# Patient Record
Sex: MALE | Race: WHITE | NOT HISPANIC OR LATINO | Employment: OTHER | URBAN - NONMETROPOLITAN AREA
[De-identification: names, ages, dates, MRNs, and addresses within clinical notes are randomized per-mention and may not be internally consistent; named-entity substitution may affect disease eponyms.]

---

## 2017-01-01 ENCOUNTER — HOSPITAL ENCOUNTER (INPATIENT)
Facility: HOSPITAL | Age: 82
LOS: 1 days | Discharge: NURSING, REHAB OR LONG-TERM CARE | End: 2017-11-30
Attending: EMERGENCY MEDICINE | Admitting: FAMILY MEDICINE

## 2017-01-01 ENCOUNTER — HOSPITAL ENCOUNTER (EMERGENCY)
Facility: HOSPITAL | Age: 82
Discharge: NURSING, REHAB OR LONG-TERM CARE | End: 2017-01-20
Attending: EMERGENCY MEDICINE | Admitting: EMERGENCY MEDICINE

## 2017-01-01 ENCOUNTER — HOSPITAL ENCOUNTER (EMERGENCY)
Facility: HOSPITAL | Age: 82
Discharge: HOME | End: 2017-12-07
Attending: EMERGENCY MEDICINE | Admitting: EMERGENCY MEDICINE

## 2017-01-01 ENCOUNTER — APPOINTMENT (OUTPATIENT)
Dept: CT IMAGING | Facility: HOSPITAL | Age: 82
End: 2017-01-01

## 2017-01-01 ENCOUNTER — OFFICE VISIT (OUTPATIENT)
Dept: UROLOGY | Facility: CLINIC | Age: 82
End: 2017-01-01

## 2017-01-01 ENCOUNTER — APPOINTMENT (OUTPATIENT)
Dept: GENERAL RADIOLOGY | Facility: HOSPITAL | Age: 82
End: 2017-01-01

## 2017-01-01 ENCOUNTER — HOSPITAL ENCOUNTER (OUTPATIENT)
Dept: CT IMAGING | Facility: HOSPITAL | Age: 82
Discharge: HOME | End: 2017-03-14
Attending: UROLOGY | Admitting: UROLOGY

## 2017-01-01 ENCOUNTER — EPISODE CHANGES (OUTPATIENT)
Dept: CASE MANAGEMENT | Facility: OTHER | Age: 82
End: 2017-01-01

## 2017-01-01 ENCOUNTER — LAB REQUISITION (OUTPATIENT)
Dept: LAB | Facility: HOSPITAL | Age: 82
End: 2017-01-01

## 2017-01-01 ENCOUNTER — HOSPITAL ENCOUNTER (OUTPATIENT)
Dept: GENERAL RADIOLOGY | Facility: HOSPITAL | Age: 82
Discharge: HOME | End: 2017-07-05
Attending: FAMILY MEDICINE | Admitting: FAMILY MEDICINE

## 2017-01-01 ENCOUNTER — HOSPITAL ENCOUNTER (EMERGENCY)
Facility: HOSPITAL | Age: 82
Discharge: NURSING, REHAB OR LONG-TERM CARE | End: 2017-10-27
Attending: FAMILY MEDICINE | Admitting: FAMILY MEDICINE

## 2017-01-01 ENCOUNTER — TRANSCRIBE ORDERS (OUTPATIENT)
Dept: ADMINISTRATIVE | Facility: HOSPITAL | Age: 82
End: 2017-01-01

## 2017-01-01 ENCOUNTER — HOSPITAL ENCOUNTER (OUTPATIENT)
Dept: CT IMAGING | Facility: HOSPITAL | Age: 82
Discharge: HOME | End: 2017-10-25
Attending: UROLOGY | Admitting: UROLOGY

## 2017-01-01 ENCOUNTER — OFFICE VISIT (OUTPATIENT)
Dept: CARDIOLOGY | Facility: CLINIC | Age: 82
End: 2017-01-01

## 2017-01-01 ENCOUNTER — HOSPITAL ENCOUNTER (EMERGENCY)
Facility: HOSPITAL | Age: 82
Discharge: NURSING, REHAB OR LONG-TERM CARE | End: 2017-02-26
Attending: EMERGENCY MEDICINE | Admitting: EMERGENCY MEDICINE

## 2017-01-01 ENCOUNTER — HOSPITAL ENCOUNTER (EMERGENCY)
Facility: HOSPITAL | Age: 82
Discharge: HOME | End: 2017-02-11
Attending: FAMILY MEDICINE | Admitting: FAMILY MEDICINE

## 2017-01-01 ENCOUNTER — TELEPHONE (OUTPATIENT)
Dept: UROLOGY | Facility: CLINIC | Age: 82
End: 2017-01-01

## 2017-01-01 VITALS
DIASTOLIC BLOOD PRESSURE: 76 MMHG | OXYGEN SATURATION: 96 % | SYSTOLIC BLOOD PRESSURE: 137 MMHG | WEIGHT: 180 LBS | BODY MASS INDEX: 23.1 KG/M2 | HEIGHT: 74 IN | TEMPERATURE: 98.1 F | RESPIRATION RATE: 15 BRPM | HEART RATE: 91 BPM

## 2017-01-01 VITALS
HEIGHT: 71 IN | WEIGHT: 180 LBS | SYSTOLIC BLOOD PRESSURE: 116 MMHG | DIASTOLIC BLOOD PRESSURE: 64 MMHG | BODY MASS INDEX: 25.2 KG/M2 | HEART RATE: 81 BPM

## 2017-01-01 VITALS
DIASTOLIC BLOOD PRESSURE: 70 MMHG | HEIGHT: 71 IN | OXYGEN SATURATION: 95 % | HEART RATE: 87 BPM | WEIGHT: 177.5 LBS | TEMPERATURE: 97 F | RESPIRATION RATE: 18 BRPM | BODY MASS INDEX: 24.85 KG/M2 | SYSTOLIC BLOOD PRESSURE: 122 MMHG

## 2017-01-01 VITALS
DIASTOLIC BLOOD PRESSURE: 70 MMHG | TEMPERATURE: 98.2 F | BODY MASS INDEX: 25.67 KG/M2 | OXYGEN SATURATION: 96 % | WEIGHT: 200 LBS | SYSTOLIC BLOOD PRESSURE: 115 MMHG | RESPIRATION RATE: 20 BRPM | HEIGHT: 74 IN | HEART RATE: 82 BPM

## 2017-01-01 VITALS
SYSTOLIC BLOOD PRESSURE: 114 MMHG | HEIGHT: 71 IN | HEART RATE: 80 BPM | DIASTOLIC BLOOD PRESSURE: 62 MMHG | BODY MASS INDEX: 24.92 KG/M2 | WEIGHT: 178 LBS

## 2017-01-01 VITALS
WEIGHT: 183.64 LBS | DIASTOLIC BLOOD PRESSURE: 60 MMHG | BODY MASS INDEX: 24.87 KG/M2 | HEIGHT: 72 IN | HEART RATE: 88 BPM | SYSTOLIC BLOOD PRESSURE: 110 MMHG | TEMPERATURE: 97.3 F | RESPIRATION RATE: 20 BRPM | OXYGEN SATURATION: 96 %

## 2017-01-01 VITALS
TEMPERATURE: 98 F | OXYGEN SATURATION: 98 % | HEIGHT: 71 IN | SYSTOLIC BLOOD PRESSURE: 116 MMHG | BODY MASS INDEX: 24.78 KG/M2 | RESPIRATION RATE: 16 BRPM | HEART RATE: 80 BPM | DIASTOLIC BLOOD PRESSURE: 68 MMHG | WEIGHT: 177 LBS

## 2017-01-01 VITALS
HEIGHT: 72 IN | OXYGEN SATURATION: 96 % | DIASTOLIC BLOOD PRESSURE: 67 MMHG | RESPIRATION RATE: 18 BRPM | BODY MASS INDEX: 24.86 KG/M2 | SYSTOLIC BLOOD PRESSURE: 118 MMHG | TEMPERATURE: 97.8 F | WEIGHT: 183.56 LBS | HEART RATE: 76 BPM

## 2017-01-01 DIAGNOSIS — N13.8 BENIGN PROSTATIC HYPERPLASIA WITH URINARY OBSTRUCTION: Primary | ICD-10-CM

## 2017-01-01 DIAGNOSIS — T83.511A URINARY TRACT INFECTION ASSOCIATED WITH INDWELLING URETHRAL CATHETER, INITIAL ENCOUNTER (HCC): Primary | ICD-10-CM

## 2017-01-01 DIAGNOSIS — I25.10 CORONARY ARTERY DISEASE INVOLVING NATIVE CORONARY ARTERY OF NATIVE HEART WITHOUT ANGINA PECTORIS: ICD-10-CM

## 2017-01-01 DIAGNOSIS — T83.021A DISLODGED FOLEY CATHETER, INITIAL ENCOUNTER: Primary | ICD-10-CM

## 2017-01-01 DIAGNOSIS — R13.10 DYSPHAGIA, UNSPECIFIED TYPE: ICD-10-CM

## 2017-01-01 DIAGNOSIS — N30.91 HEMORRHAGIC CYSTITIS: ICD-10-CM

## 2017-01-01 DIAGNOSIS — R13.10 DYSPHAGIA, UNSPECIFIED TYPE: Primary | ICD-10-CM

## 2017-01-01 DIAGNOSIS — R33.8 URINARY RETENTION DUE TO BENIGN PROSTATIC HYPERPLASIA: Primary | ICD-10-CM

## 2017-01-01 DIAGNOSIS — R30.0 DYSURIA: ICD-10-CM

## 2017-01-01 DIAGNOSIS — I10 ESSENTIAL HYPERTENSION: ICD-10-CM

## 2017-01-01 DIAGNOSIS — I48.0 PAROXYSMAL ATRIAL FIBRILLATION (HCC): Primary | ICD-10-CM

## 2017-01-01 DIAGNOSIS — C61 MALIGNANT NEOPLASM OF PROSTATE (HCC): ICD-10-CM

## 2017-01-01 DIAGNOSIS — S31.21XA PENILE LACERATION, INITIAL ENCOUNTER: Primary | ICD-10-CM

## 2017-01-01 DIAGNOSIS — T83.511A URINARY TRACT INFECTION ASSOCIATED WITH INDWELLING URETHRAL CATHETER, INITIAL ENCOUNTER (HCC): ICD-10-CM

## 2017-01-01 DIAGNOSIS — N40.1 BENIGN PROSTATIC HYPERPLASIA WITH URINARY OBSTRUCTION: Primary | ICD-10-CM

## 2017-01-01 DIAGNOSIS — N17.9 ACUTE KIDNEY FAILURE (HCC): ICD-10-CM

## 2017-01-01 DIAGNOSIS — N39.0 URINARY TRACT INFECTION ASSOCIATED WITH INDWELLING URETHRAL CATHETER, INITIAL ENCOUNTER (HCC): Primary | ICD-10-CM

## 2017-01-01 DIAGNOSIS — C61 PROSTATE CA (HCC): Primary | ICD-10-CM

## 2017-01-01 DIAGNOSIS — E78.5 HYPERLIPIDEMIA LDL GOAL <70: ICD-10-CM

## 2017-01-01 DIAGNOSIS — Z97.8 FOLEY CATHETER IN PLACE: ICD-10-CM

## 2017-01-01 DIAGNOSIS — C61 PROSTATE CANCER (HCC): Primary | ICD-10-CM

## 2017-01-01 DIAGNOSIS — T83.9XXA COMPLICATION OF FOLEY CATHETER, INITIAL ENCOUNTER (HCC): ICD-10-CM

## 2017-01-01 DIAGNOSIS — N39.0 URINARY TRACT INFECTION ASSOCIATED WITH INDWELLING URETHRAL CATHETER, SUBSEQUENT ENCOUNTER: Primary | ICD-10-CM

## 2017-01-01 DIAGNOSIS — T83.511D URINARY TRACT INFECTION ASSOCIATED WITH INDWELLING URETHRAL CATHETER, SUBSEQUENT ENCOUNTER: Primary | ICD-10-CM

## 2017-01-01 DIAGNOSIS — C61 PROSTATE CA (HCC): ICD-10-CM

## 2017-01-01 DIAGNOSIS — D64.9 ANEMIA: ICD-10-CM

## 2017-01-01 DIAGNOSIS — N40.1 URINARY RETENTION DUE TO BENIGN PROSTATIC HYPERPLASIA: Primary | ICD-10-CM

## 2017-01-01 DIAGNOSIS — N40.1 ENLARGED PROSTATE WITH LOWER URINARY TRACT SYMPTOMS (LUTS): ICD-10-CM

## 2017-01-01 DIAGNOSIS — C79.51 PROSTATE CANCER METASTATIC TO BONE (HCC): ICD-10-CM

## 2017-01-01 DIAGNOSIS — C61 PROSTATE CANCER METASTATIC TO BONE (HCC): ICD-10-CM

## 2017-01-01 DIAGNOSIS — C61 MALIGNANT NEOPLASM OF PROSTATE (HCC): Primary | ICD-10-CM

## 2017-01-01 DIAGNOSIS — N39.0 URINARY TRACT INFECTION ASSOCIATED WITH INDWELLING URETHRAL CATHETER, INITIAL ENCOUNTER (HCC): ICD-10-CM

## 2017-01-01 LAB
027 TOXIN: NORMAL
ALBUMIN SERPL-MCNC: 3.5 G/DL (ref 3.4–4.8)
ALBUMIN SERPL-MCNC: 3.6 G/DL (ref 3.4–4.8)
ALBUMIN SERPL-MCNC: 3.6 G/DL (ref 3.4–4.8)
ALBUMIN SERPL-MCNC: 3.8 G/DL (ref 3.4–4.8)
ALBUMIN SERPL-MCNC: 4.1 G/DL (ref 3.4–4.8)
ALBUMIN/GLOB SERPL: 1 G/DL (ref 1.5–2.5)
ALBUMIN/GLOB SERPL: 1.1 G/DL (ref 1.5–2.5)
ALBUMIN/GLOB SERPL: 1.1 G/DL (ref 1.5–2.5)
ALBUMIN/GLOB SERPL: 1.2 G/DL (ref 1.5–2.5)
ALBUMIN/GLOB SERPL: 1.3 G/DL (ref 1.5–2.5)
ALP SERPL-CCNC: 187 U/L (ref 46–116)
ALP SERPL-CCNC: 213 U/L (ref 46–116)
ALP SERPL-CCNC: 245 U/L (ref 40–129)
ALP SERPL-CCNC: 441 U/L (ref 40–129)
ALP SERPL-CCNC: 554 U/L (ref 40–129)
ALT SERPL W P-5'-P-CCNC: 20 U/L (ref 10–44)
ALT SERPL W P-5'-P-CCNC: 20 U/L (ref 10–44)
ALT SERPL W P-5'-P-CCNC: 25 U/L (ref 10–44)
ALT SERPL W P-5'-P-CCNC: 34 U/L (ref 10–44)
ALT SERPL W P-5'-P-CCNC: 52 U/L (ref 10–44)
ANION GAP SERPL CALCULATED.3IONS-SCNC: 11.2 MMOL/L (ref 3.6–11.2)
ANION GAP SERPL CALCULATED.3IONS-SCNC: 3.8 MMOL/L (ref 3.6–11.2)
ANION GAP SERPL CALCULATED.3IONS-SCNC: 4.2 MMOL/L (ref 3.6–11.2)
ANION GAP SERPL CALCULATED.3IONS-SCNC: 5.7 MMOL/L (ref 3.6–11.2)
ANION GAP SERPL CALCULATED.3IONS-SCNC: 6.3 MMOL/L (ref 3.6–11.2)
ANION GAP SERPL CALCULATED.3IONS-SCNC: 7.8 MMOL/L (ref 3.6–11.2)
ANION GAP SERPL CALCULATED.3IONS-SCNC: 8.2 MMOL/L (ref 3.6–11.2)
ANISOCYTOSIS BLD QL: NORMAL
APTT PPP: 25.4 SECONDS (ref 24.4–31)
AST SERPL-CCNC: 21 U/L (ref 10–34)
AST SERPL-CCNC: 22 U/L (ref 10–34)
AST SERPL-CCNC: 24 U/L (ref 10–34)
AST SERPL-CCNC: 34 U/L (ref 10–34)
AST SERPL-CCNC: 52 U/L (ref 10–34)
BACTERIA SPEC AEROBE CULT: ABNORMAL
BACTERIA UR QL AUTO: ABNORMAL /HPF
BASOPHILS # BLD AUTO: 0.01 10*3/MM3 (ref 0–0.3)
BASOPHILS # BLD AUTO: 0.01 10*3/MM3 (ref 0–0.3)
BASOPHILS # BLD AUTO: 0.02 10*3/MM3 (ref 0–0.3)
BASOPHILS # BLD AUTO: 0.04 10*3/MM3 (ref 0–0.3)
BASOPHILS # BLD AUTO: 0.05 10*3/MM3 (ref 0–0.3)
BASOPHILS # BLD AUTO: 0.07 10*3/MM3 (ref 0–0.3)
BASOPHILS NFR BLD AUTO: 0.2 % (ref 0–2)
BASOPHILS NFR BLD AUTO: 0.3 % (ref 0–2)
BASOPHILS NFR BLD AUTO: 0.5 % (ref 0–2)
BASOPHILS NFR BLD AUTO: 0.8 % (ref 0–2)
BASOPHILS NFR BLD AUTO: 1.1 % (ref 0–2)
BASOPHILS NFR BLD AUTO: 1.2 % (ref 0–2)
BILIRUB SERPL-MCNC: 0.2 MG/DL (ref 0.2–1.8)
BILIRUB SERPL-MCNC: 0.3 MG/DL (ref 0.2–1.8)
BILIRUB SERPL-MCNC: 0.4 MG/DL (ref 0.2–1.8)
BILIRUB SERPL-MCNC: 0.4 MG/DL (ref 0.2–1.8)
BILIRUB SERPL-MCNC: 0.6 MG/DL (ref 0.2–1.8)
BILIRUB UR QL STRIP: ABNORMAL
BILIRUB UR QL STRIP: NEGATIVE
BILIRUB UR QL STRIP: NEGATIVE
BUN BLD-MCNC: 19 MG/DL (ref 7–21)
BUN BLD-MCNC: 22 MG/DL (ref 7–21)
BUN BLD-MCNC: 26 MG/DL (ref 7–21)
BUN BLD-MCNC: 27 MG/DL (ref 7–21)
BUN BLD-MCNC: 29 MG/DL (ref 7–21)
BUN BLD-MCNC: 36 MG/DL (ref 7–21)
BUN BLD-MCNC: 40 MG/DL (ref 7–21)
BUN/CREAT SERPL: 21.3 (ref 7–25)
BUN/CREAT SERPL: 22 (ref 7–25)
BUN/CREAT SERPL: 24.1 (ref 7–25)
BUN/CREAT SERPL: 24.6 (ref 7–25)
BUN/CREAT SERPL: 26 (ref 7–25)
BUN/CREAT SERPL: 27.6 (ref 7–25)
BUN/CREAT SERPL: 27.7 (ref 7–25)
C DIFF TOX GENS STL QL NAA+PROBE: NEGATIVE
CALCIUM SPEC-SCNC: 8.7 MG/DL (ref 7.7–10)
CALCIUM SPEC-SCNC: 8.7 MG/DL (ref 7.7–10)
CALCIUM SPEC-SCNC: 8.9 MG/DL (ref 7.7–10)
CALCIUM SPEC-SCNC: 8.9 MG/DL (ref 7.7–10)
CALCIUM SPEC-SCNC: 9 MG/DL (ref 7.7–10)
CALCIUM SPEC-SCNC: 9.2 MG/DL (ref 7.7–10)
CALCIUM SPEC-SCNC: 9.2 MG/DL (ref 7.7–10)
CHLORIDE SERPL-SCNC: 100 MMOL/L (ref 99–112)
CHLORIDE SERPL-SCNC: 106 MMOL/L (ref 99–112)
CHLORIDE SERPL-SCNC: 106 MMOL/L (ref 99–112)
CHLORIDE SERPL-SCNC: 108 MMOL/L (ref 99–112)
CHLORIDE SERPL-SCNC: 110 MMOL/L (ref 99–112)
CHLORIDE SERPL-SCNC: 111 MMOL/L (ref 99–112)
CHLORIDE SERPL-SCNC: 112 MMOL/L (ref 99–112)
CK MB SERPL-CCNC: 0.61 NG/ML (ref 0–5)
CK MB SERPL-RTO: 1 % (ref 0–3)
CK SERPL-CCNC: 63 U/L (ref 24–204)
CLARITY UR: ABNORMAL
CO2 SERPL-SCNC: 22.2 MMOL/L (ref 24.3–31.9)
CO2 SERPL-SCNC: 22.8 MMOL/L (ref 24.3–31.9)
CO2 SERPL-SCNC: 22.8 MMOL/L (ref 24.3–31.9)
CO2 SERPL-SCNC: 23.2 MMOL/L (ref 24.3–31.9)
CO2 SERPL-SCNC: 24.8 MMOL/L (ref 24.3–31.9)
CO2 SERPL-SCNC: 25.7 MMOL/L (ref 24.3–31.9)
CO2 SERPL-SCNC: 26.3 MMOL/L (ref 24.3–31.9)
COLOR UR: ABNORMAL
CREAT BLD-MCNC: 0.89 MG/DL (ref 0.43–1.29)
CREAT BLD-MCNC: 1 MG/DL (ref 0.43–1.29)
CREAT BLD-MCNC: 1 MG/DL (ref 0.43–1.29)
CREAT BLD-MCNC: 1.12 MG/DL (ref 0.43–1.29)
CREAT BLD-MCNC: 1.18 MG/DL (ref 0.43–1.29)
CREAT BLD-MCNC: 1.3 MG/DL (ref 0.43–1.29)
CREAT BLD-MCNC: 1.45 MG/DL (ref 0.43–1.29)
D DIMER PPP FEU-MCNC: 1.18 MCGFEU/ML (ref 0–0.5)
DEPRECATED RDW RBC AUTO: 58.5 FL (ref 37–54)
DEPRECATED RDW RBC AUTO: 59.9 FL (ref 37–54)
DEPRECATED RDW RBC AUTO: 60.7 FL (ref 37–54)
DEPRECATED RDW RBC AUTO: 60.7 FL (ref 37–54)
DEPRECATED RDW RBC AUTO: 61.9 FL (ref 37–54)
DEPRECATED RDW RBC AUTO: 63.2 FL (ref 37–54)
DEPRECATED RDW RBC AUTO: 63.8 FL (ref 37–54)
EOSINOPHIL # BLD AUTO: 0.11 10*3/MM3 (ref 0–0.7)
EOSINOPHIL # BLD AUTO: 0.2 10*3/MM3 (ref 0–0.7)
EOSINOPHIL # BLD AUTO: 0.28 10*3/MM3 (ref 0–0.7)
EOSINOPHIL # BLD AUTO: 0.3 10*3/MM3 (ref 0–0.7)
EOSINOPHIL # BLD AUTO: 0.4 10*3/MM3 (ref 0–0.7)
EOSINOPHIL # BLD AUTO: 0.67 10*3/MM3 (ref 0–0.7)
EOSINOPHIL # BLD MANUAL: 0.26 10*3/MM3 (ref 0–0.7)
EOSINOPHIL NFR BLD AUTO: 11.1 % (ref 0–7)
EOSINOPHIL NFR BLD AUTO: 2.6 % (ref 0–7)
EOSINOPHIL NFR BLD AUTO: 4.1 % (ref 0–7)
EOSINOPHIL NFR BLD AUTO: 6.9 % (ref 0–7)
EOSINOPHIL NFR BLD AUTO: 7.1 % (ref 0–7)
EOSINOPHIL NFR BLD AUTO: 8.6 % (ref 0–7)
EOSINOPHIL NFR BLD MANUAL: 3 % (ref 0–7)
ERYTHROCYTE [DISTWIDTH] IN BLOOD BY AUTOMATED COUNT: 18.6 % (ref 11.5–14.5)
ERYTHROCYTE [DISTWIDTH] IN BLOOD BY AUTOMATED COUNT: 18.6 % (ref 11.5–14.5)
ERYTHROCYTE [DISTWIDTH] IN BLOOD BY AUTOMATED COUNT: 18.8 % (ref 11.5–14.5)
ERYTHROCYTE [DISTWIDTH] IN BLOOD BY AUTOMATED COUNT: 18.8 % (ref 11.5–14.5)
ERYTHROCYTE [DISTWIDTH] IN BLOOD BY AUTOMATED COUNT: 19 % (ref 11.5–14.5)
ERYTHROCYTE [DISTWIDTH] IN BLOOD BY AUTOMATED COUNT: 19.2 % (ref 11.5–14.5)
ERYTHROCYTE [DISTWIDTH] IN BLOOD BY AUTOMATED COUNT: 19.4 % (ref 11.5–14.5)
GFR SERPL CREATININE-BSD FRML MDRD: 45 ML/MIN/1.73
GFR SERPL CREATININE-BSD FRML MDRD: 51 ML/MIN/1.73
GFR SERPL CREATININE-BSD FRML MDRD: 57 ML/MIN/1.73
GFR SERPL CREATININE-BSD FRML MDRD: 61 ML/MIN/1.73
GFR SERPL CREATININE-BSD FRML MDRD: 69 ML/MIN/1.73
GFR SERPL CREATININE-BSD FRML MDRD: 69 ML/MIN/1.73
GFR SERPL CREATININE-BSD FRML MDRD: 79 ML/MIN/1.73
GLOBULIN UR ELPH-MCNC: 3 GM/DL
GLOBULIN UR ELPH-MCNC: 3.2 GM/DL
GLOBULIN UR ELPH-MCNC: 3.3 GM/DL
GLOBULIN UR ELPH-MCNC: 3.6 GM/DL
GLOBULIN UR ELPH-MCNC: 3.6 GM/DL
GLUCOSE BLD-MCNC: 104 MG/DL (ref 70–110)
GLUCOSE BLD-MCNC: 119 MG/DL (ref 70–110)
GLUCOSE BLD-MCNC: 175 MG/DL (ref 70–110)
GLUCOSE BLD-MCNC: 228 MG/DL (ref 70–110)
GLUCOSE BLD-MCNC: 241 MG/DL (ref 70–110)
GLUCOSE BLD-MCNC: 241 MG/DL (ref 70–110)
GLUCOSE BLD-MCNC: 91 MG/DL (ref 70–110)
GLUCOSE UR STRIP-MCNC: NEGATIVE MG/DL
HCT VFR BLD AUTO: 34 % (ref 42–52)
HCT VFR BLD AUTO: 34.7 % (ref 42–52)
HCT VFR BLD AUTO: 36.2 % (ref 42–52)
HCT VFR BLD AUTO: 36.2 % (ref 42–52)
HCT VFR BLD AUTO: 36.4 % (ref 42–52)
HCT VFR BLD AUTO: 39.2 % (ref 42–52)
HCT VFR BLD AUTO: 40.4 % (ref 42–52)
HGB BLD-MCNC: 10.1 G/DL (ref 14–18)
HGB BLD-MCNC: 10.8 G/DL (ref 14–18)
HGB BLD-MCNC: 10.9 G/DL (ref 14–18)
HGB BLD-MCNC: 11.2 G/DL (ref 14–18)
HGB BLD-MCNC: 11.4 G/DL (ref 14–18)
HGB BLD-MCNC: 12.4 G/DL (ref 14–18)
HGB BLD-MCNC: 13.2 G/DL (ref 14–18)
HGB UR QL STRIP.AUTO: ABNORMAL
HOLD SPECIMEN: NORMAL
HOLD SPECIMEN: NORMAL
HYALINE CASTS UR QL AUTO: ABNORMAL /LPF
IMM GRANULOCYTES # BLD: 0.01 10*3/MM3 (ref 0–0.03)
IMM GRANULOCYTES # BLD: 0.02 10*3/MM3 (ref 0–0.03)
IMM GRANULOCYTES # BLD: 0.02 10*3/MM3 (ref 0–0.03)
IMM GRANULOCYTES # BLD: 0.04 10*3/MM3 (ref 0–0.03)
IMM GRANULOCYTES # BLD: 0.15 10*3/MM3 (ref 0–0.03)
IMM GRANULOCYTES # BLD: 0.29 10*3/MM3 (ref 0–0.03)
IMM GRANULOCYTES NFR BLD: 0.2 % (ref 0–0.5)
IMM GRANULOCYTES NFR BLD: 0.5 % (ref 0–0.5)
IMM GRANULOCYTES NFR BLD: 0.6 % (ref 0–0.5)
IMM GRANULOCYTES NFR BLD: 0.9 % (ref 0–0.5)
IMM GRANULOCYTES NFR BLD: 2.5 % (ref 0–0.5)
IMM GRANULOCYTES NFR BLD: 5 % (ref 0–0.5)
INR PPP: 1 (ref 0.8–1.1)
KETONES UR QL STRIP: ABNORMAL
KETONES UR QL STRIP: ABNORMAL
KETONES UR QL STRIP: NEGATIVE
LEUKOCYTE ESTERASE UR QL STRIP.AUTO: ABNORMAL
LIPASE SERPL-CCNC: 26 U/L (ref 13–60)
LYMPHOCYTES # BLD AUTO: 0.74 10*3/MM3 (ref 1–3)
LYMPHOCYTES # BLD AUTO: 1.03 10*3/MM3 (ref 1–3)
LYMPHOCYTES # BLD AUTO: 1.43 10*3/MM3 (ref 1–3)
LYMPHOCYTES # BLD AUTO: 1.53 10*3/MM3 (ref 1–3)
LYMPHOCYTES # BLD AUTO: 1.62 10*3/MM3 (ref 1–3)
LYMPHOCYTES # BLD AUTO: 1.79 10*3/MM3 (ref 1–3)
LYMPHOCYTES # BLD MANUAL: 2.11 10*3/MM3 (ref 1–3)
LYMPHOCYTES NFR BLD AUTO: 17.2 % (ref 16–46)
LYMPHOCYTES NFR BLD AUTO: 25.5 % (ref 16–46)
LYMPHOCYTES NFR BLD AUTO: 26.1 % (ref 16–46)
LYMPHOCYTES NFR BLD AUTO: 30.8 % (ref 16–46)
LYMPHOCYTES NFR BLD AUTO: 33.1 % (ref 16–46)
LYMPHOCYTES NFR BLD AUTO: 40.9 % (ref 16–46)
LYMPHOCYTES NFR BLD MANUAL: 24 % (ref 16–46)
LYMPHOCYTES NFR BLD MANUAL: 3 % (ref 0–12)
MCH RBC QN AUTO: 26.3 PG (ref 27–33)
MCH RBC QN AUTO: 26.5 PG (ref 27–33)
MCH RBC QN AUTO: 26.7 PG (ref 27–33)
MCH RBC QN AUTO: 27.7 PG (ref 27–33)
MCH RBC QN AUTO: 28.9 PG (ref 27–33)
MCH RBC QN AUTO: 29 PG (ref 27–33)
MCH RBC QN AUTO: 29 PG (ref 27–33)
MCHC RBC AUTO-ENTMCNC: 29.7 G/DL (ref 33–37)
MCHC RBC AUTO-ENTMCNC: 30.1 G/DL (ref 33–37)
MCHC RBC AUTO-ENTMCNC: 30.8 G/DL (ref 33–37)
MCHC RBC AUTO-ENTMCNC: 31.1 G/DL (ref 33–37)
MCHC RBC AUTO-ENTMCNC: 31.5 G/DL (ref 33–37)
MCHC RBC AUTO-ENTMCNC: 31.6 G/DL (ref 33–37)
MCHC RBC AUTO-ENTMCNC: 32.7 G/DL (ref 33–37)
MCV RBC AUTO: 86.7 FL (ref 80–94)
MCV RBC AUTO: 87.4 FL (ref 80–94)
MCV RBC AUTO: 88.8 FL (ref 80–94)
MCV RBC AUTO: 89 FL (ref 80–94)
MCV RBC AUTO: 89.2 FL (ref 80–94)
MCV RBC AUTO: 91.6 FL (ref 80–94)
MCV RBC AUTO: 91.8 FL (ref 80–94)
MONOCYTES # BLD AUTO: 0.26 10*3/MM3 (ref 0.1–0.9)
MONOCYTES # BLD AUTO: 0.36 10*3/MM3 (ref 0.1–0.9)
MONOCYTES # BLD AUTO: 0.39 10*3/MM3 (ref 0.1–0.9)
MONOCYTES # BLD AUTO: 0.4 10*3/MM3 (ref 0.1–0.9)
MONOCYTES # BLD AUTO: 0.41 10*3/MM3 (ref 0.1–0.9)
MONOCYTES # BLD AUTO: 0.56 10*3/MM3 (ref 0.1–0.9)
MONOCYTES # BLD AUTO: 0.58 10*3/MM3 (ref 0.1–0.9)
MONOCYTES NFR BLD AUTO: 10.1 % (ref 0–12)
MONOCYTES NFR BLD AUTO: 10.3 % (ref 0–12)
MONOCYTES NFR BLD AUTO: 8 % (ref 0–12)
MONOCYTES NFR BLD AUTO: 9.5 % (ref 0–12)
MONOCYTES NFR BLD AUTO: 9.6 % (ref 0–12)
MONOCYTES NFR BLD AUTO: 9.7 % (ref 0–12)
NEUTROPHILS # BLD AUTO: 1.35 10*3/MM3 (ref 1.4–6.5)
NEUTROPHILS # BLD AUTO: 2.21 10*3/MM3 (ref 1.4–6.5)
NEUTROPHILS # BLD AUTO: 2.67 10*3/MM3 (ref 1.4–6.5)
NEUTROPHILS # BLD AUTO: 2.7 10*3/MM3 (ref 1.4–6.5)
NEUTROPHILS # BLD AUTO: 2.98 10*3/MM3 (ref 1.4–6.5)
NEUTROPHILS # BLD AUTO: 3.03 10*3/MM3 (ref 1.4–6.5)
NEUTROPHILS # BLD AUTO: 6.15 10*3/MM3 (ref 1.4–6.5)
NEUTROPHILS NFR BLD AUTO: 38.5 % (ref 40–75)
NEUTROPHILS NFR BLD AUTO: 46.5 % (ref 40–75)
NEUTROPHILS NFR BLD AUTO: 50.4 % (ref 40–75)
NEUTROPHILS NFR BLD AUTO: 54.4 % (ref 40–75)
NEUTROPHILS NFR BLD AUTO: 55.9 % (ref 40–75)
NEUTROPHILS NFR BLD AUTO: 69.3 % (ref 40–75)
NEUTROPHILS NFR BLD MANUAL: 70 % (ref 40–75)
NITRITE UR QL STRIP: POSITIVE
NRBC BLD MANUAL-RTO: 0 /100 WBC (ref 0–0)
OSMOLALITY SERPL CALC.SUM OF ELEC: 277.5 MOSM/KG (ref 273–305)
OSMOLALITY SERPL CALC.SUM OF ELEC: 278.3 MOSM/KG (ref 273–305)
OSMOLALITY SERPL CALC.SUM OF ELEC: 282.6 MOSM/KG (ref 273–305)
OSMOLALITY SERPL CALC.SUM OF ELEC: 283.3 MOSM/KG (ref 273–305)
OSMOLALITY SERPL CALC.SUM OF ELEC: 290.6 MOSM/KG (ref 273–305)
OSMOLALITY SERPL CALC.SUM OF ELEC: 291.2 MOSM/KG (ref 273–305)
OSMOLALITY SERPL CALC.SUM OF ELEC: 297.7 MOSM/KG (ref 273–305)
PH UR STRIP.AUTO: 5.5 [PH] (ref 5–8)
PH UR STRIP.AUTO: 7.5 [PH] (ref 5–8)
PH UR STRIP.AUTO: <=5 [PH] (ref 5–8)
PH UR STRIP.AUTO: <=5 [PH] (ref 5–8)
PH UR STRIP.AUTO: >=9 [PH] (ref 5–8)
PLAT MORPH BLD: NORMAL
PLATELET # BLD AUTO: 127 10*3/MM3 (ref 130–400)
PLATELET # BLD AUTO: 131 10*3/MM3 (ref 130–400)
PLATELET # BLD AUTO: 143 10*3/MM3 (ref 130–400)
PLATELET # BLD AUTO: 144 10*3/MM3 (ref 130–400)
PLATELET # BLD AUTO: 177 10*3/MM3 (ref 130–400)
PLATELET # BLD AUTO: 201 10*3/MM3 (ref 130–400)
PLATELET # BLD AUTO: 212 10*3/MM3 (ref 130–400)
PMV BLD AUTO: 10.9 FL (ref 6–10)
PMV BLD AUTO: 11.3 FL (ref 6–10)
PMV BLD AUTO: 11.3 FL (ref 6–10)
PMV BLD AUTO: 11.6 FL (ref 6–10)
PMV BLD AUTO: 11.6 FL (ref 6–10)
PMV BLD AUTO: 12.1 FL (ref 6–10)
PMV BLD AUTO: ABNORMAL FL (ref 6–10)
POTASSIUM BLD-SCNC: 4 MMOL/L (ref 3.5–5.3)
POTASSIUM BLD-SCNC: 4.2 MMOL/L (ref 3.5–5.3)
POTASSIUM BLD-SCNC: 4.2 MMOL/L (ref 3.5–5.3)
POTASSIUM BLD-SCNC: 4.4 MMOL/L (ref 3.5–5.3)
POTASSIUM BLD-SCNC: 4.6 MMOL/L (ref 3.5–5.3)
POTASSIUM BLD-SCNC: 4.8 MMOL/L (ref 3.5–5.3)
POTASSIUM BLD-SCNC: 4.8 MMOL/L (ref 3.5–5.3)
PROT SERPL-MCNC: 6.5 G/DL (ref 6–8)
PROT SERPL-MCNC: 6.9 G/DL (ref 6–8)
PROT SERPL-MCNC: 7.2 G/DL (ref 6–8)
PROT SERPL-MCNC: 7.3 G/DL (ref 6–8)
PROT SERPL-MCNC: 7.4 G/DL (ref 6–8)
PROT UR QL STRIP: ABNORMAL
PROTHROMBIN TIME: 11.3 SECONDS (ref 9.8–11.9)
PSA SERPL-MCNC: 171.17 NG/ML (ref 0–4)
PSA SERPL-MCNC: 230.11 NG/ML (ref 0–4)
PSA SERPL-MCNC: 277.36 NG/ML (ref 0–4)
RBC # BLD AUTO: 3.81 10*6/MM3 (ref 4.7–6.1)
RBC # BLD AUTO: 3.9 10*6/MM3 (ref 4.7–6.1)
RBC # BLD AUTO: 3.95 10*6/MM3 (ref 4.7–6.1)
RBC # BLD AUTO: 4.14 10*6/MM3 (ref 4.7–6.1)
RBC # BLD AUTO: 4.2 10*6/MM3 (ref 4.7–6.1)
RBC # BLD AUTO: 4.27 10*6/MM3 (ref 4.7–6.1)
RBC # BLD AUTO: 4.55 10*6/MM3 (ref 4.7–6.1)
RBC # UR: ABNORMAL /HPF
REF LAB TEST METHOD: ABNORMAL
SCAN SLIDE: NORMAL
SODIUM BLD-SCNC: 132 MMOL/L (ref 135–153)
SODIUM BLD-SCNC: 138 MMOL/L (ref 135–153)
SODIUM BLD-SCNC: 139 MMOL/L (ref 135–153)
SODIUM BLD-SCNC: 139 MMOL/L (ref 135–153)
SODIUM BLD-SCNC: 144 MMOL/L (ref 135–153)
SP GR UR STRIP: 1.02 (ref 1–1.03)
SP GR UR STRIP: 1.03 (ref 1–1.03)
SQUAMOUS #/AREA URNS HPF: ABNORMAL /HPF
TROPONIN I SERPL-MCNC: 0.01 NG/ML
UROBILINOGEN UR QL STRIP: ABNORMAL
WBC NRBC COR # BLD: 3.5 10*3/MM3 (ref 4.5–12.5)
WBC NRBC COR # BLD: 3.95 10*3/MM3 (ref 4.5–12.5)
WBC NRBC COR # BLD: 4.3 10*3/MM3 (ref 4.5–12.5)
WBC NRBC COR # BLD: 4.9 10*3/MM3 (ref 4.5–12.5)
WBC NRBC COR # BLD: 5.81 10*3/MM3 (ref 4.5–12.5)
WBC NRBC COR # BLD: 6.01 10*3/MM3 (ref 4.5–12.5)
WBC NRBC COR # BLD: 8.79 10*3/MM3 (ref 4.5–12.5)
WBC UR QL AUTO: ABNORMAL /HPF
WHOLE BLOOD HOLD SPECIMEN: NORMAL
WHOLE BLOOD HOLD SPECIMEN: NORMAL

## 2017-01-01 PROCEDURE — 92611 MOTION FLUOROSCOPY/SWALLOW: CPT

## 2017-01-01 PROCEDURE — 87186 SC STD MICRODIL/AGAR DIL: CPT | Performed by: EMERGENCY MEDICINE

## 2017-01-01 PROCEDURE — 94799 UNLISTED PULMONARY SVC/PX: CPT

## 2017-01-01 PROCEDURE — 84153 ASSAY OF PSA TOTAL: CPT | Performed by: UROLOGY

## 2017-01-01 PROCEDURE — G8997 SWALLOW GOAL STATUS: HCPCS

## 2017-01-01 PROCEDURE — 85025 COMPLETE CBC W/AUTO DIFF WBC: CPT | Performed by: FAMILY MEDICINE

## 2017-01-01 PROCEDURE — 0 IOPAMIDOL PER 1 ML: Performed by: FAMILY MEDICINE

## 2017-01-01 PROCEDURE — 81001 URINALYSIS AUTO W/SCOPE: CPT | Performed by: FAMILY MEDICINE

## 2017-01-01 PROCEDURE — 96372 THER/PROPH/DIAG INJ SC/IM: CPT

## 2017-01-01 PROCEDURE — 80053 COMPREHEN METABOLIC PANEL: CPT | Performed by: PHYSICIAN ASSISTANT

## 2017-01-01 PROCEDURE — G0378 HOSPITAL OBSERVATION PER HR: HCPCS

## 2017-01-01 PROCEDURE — 36415 COLL VENOUS BLD VENIPUNCTURE: CPT | Performed by: UROLOGY

## 2017-01-01 PROCEDURE — 87086 URINE CULTURE/COLONY COUNT: CPT | Performed by: FAMILY MEDICINE

## 2017-01-01 PROCEDURE — 87077 CULTURE AEROBIC IDENTIFY: CPT | Performed by: EMERGENCY MEDICINE

## 2017-01-01 PROCEDURE — 81001 URINALYSIS AUTO W/SCOPE: CPT | Performed by: EMERGENCY MEDICINE

## 2017-01-01 PROCEDURE — 85025 COMPLETE CBC W/AUTO DIFF WBC: CPT | Performed by: EMERGENCY MEDICINE

## 2017-01-01 PROCEDURE — 99284 EMERGENCY DEPT VISIT MOD MDM: CPT

## 2017-01-01 PROCEDURE — 99214 OFFICE O/P EST MOD 30 MIN: CPT | Performed by: NURSE PRACTITIONER

## 2017-01-01 PROCEDURE — 36415 COLL VENOUS BLD VENIPUNCTURE: CPT | Performed by: NURSE PRACTITIONER

## 2017-01-01 PROCEDURE — 74176 CT ABD & PELVIS W/O CONTRAST: CPT | Performed by: RADIOLOGY

## 2017-01-01 PROCEDURE — 85379 FIBRIN DEGRADATION QUANT: CPT | Performed by: EMERGENCY MEDICINE

## 2017-01-01 PROCEDURE — 25010000002 CEFTRIAXONE: Performed by: EMERGENCY MEDICINE

## 2017-01-01 PROCEDURE — 80053 COMPREHEN METABOLIC PANEL: CPT | Performed by: EMERGENCY MEDICINE

## 2017-01-01 PROCEDURE — 99283 EMERGENCY DEPT VISIT LOW MDM: CPT

## 2017-01-01 PROCEDURE — 74176 CT ABD & PELVIS W/O CONTRAST: CPT

## 2017-01-01 PROCEDURE — 74230 X-RAY XM SWLNG FUNCJ C+: CPT | Performed by: RADIOLOGY

## 2017-01-01 PROCEDURE — 25010000002 HEPARIN (PORCINE) PER 1000 UNITS: Performed by: FAMILY MEDICINE

## 2017-01-01 PROCEDURE — 99204 OFFICE O/P NEW MOD 45 MIN: CPT | Performed by: UROLOGY

## 2017-01-01 PROCEDURE — 51798 US URINE CAPACITY MEASURE: CPT

## 2017-01-01 PROCEDURE — 87086 URINE CULTURE/COLONY COUNT: CPT | Performed by: EMERGENCY MEDICINE

## 2017-01-01 PROCEDURE — 25010000002 VANCOMYCIN PER 500 MG

## 2017-01-01 PROCEDURE — 25010000002 CEFTRIAXONE PER 250 MG: Performed by: FAMILY MEDICINE

## 2017-01-01 PROCEDURE — 82553 CREATINE MB FRACTION: CPT | Performed by: EMERGENCY MEDICINE

## 2017-01-01 PROCEDURE — 87493 C DIFF AMPLIFIED PROBE: CPT | Performed by: FAMILY MEDICINE

## 2017-01-01 PROCEDURE — 99213 OFFICE O/P EST LOW 20 MIN: CPT | Performed by: UROLOGY

## 2017-01-01 PROCEDURE — 87086 URINE CULTURE/COLONY COUNT: CPT | Performed by: PHYSICIAN ASSISTANT

## 2017-01-01 PROCEDURE — 87186 SC STD MICRODIL/AGAR DIL: CPT | Performed by: FAMILY MEDICINE

## 2017-01-01 PROCEDURE — 87088 URINE BACTERIA CULTURE: CPT | Performed by: PHYSICIAN ASSISTANT

## 2017-01-01 PROCEDURE — 96402 CHEMO HORMON ANTINEOPL SQ/IM: CPT | Performed by: UROLOGY

## 2017-01-01 PROCEDURE — 87088 URINE BACTERIA CULTURE: CPT | Performed by: EMERGENCY MEDICINE

## 2017-01-01 PROCEDURE — 93005 ELECTROCARDIOGRAM TRACING: CPT | Performed by: EMERGENCY MEDICINE

## 2017-01-01 PROCEDURE — 99221 1ST HOSP IP/OBS SF/LOW 40: CPT | Performed by: UROLOGY

## 2017-01-01 PROCEDURE — 87088 URINE BACTERIA CULTURE: CPT | Performed by: FAMILY MEDICINE

## 2017-01-01 PROCEDURE — 71010 XR CHEST 1 VW: CPT | Performed by: RADIOLOGY

## 2017-01-01 PROCEDURE — 80048 BASIC METABOLIC PNL TOTAL CA: CPT | Performed by: NURSE PRACTITIONER

## 2017-01-01 PROCEDURE — 74230 X-RAY XM SWLNG FUNCJ C+: CPT

## 2017-01-01 PROCEDURE — 87147 CULTURE TYPE IMMUNOLOGIC: CPT | Performed by: FAMILY MEDICINE

## 2017-01-01 PROCEDURE — 36415 COLL VENOUS BLD VENIPUNCTURE: CPT

## 2017-01-01 PROCEDURE — 83690 ASSAY OF LIPASE: CPT | Performed by: EMERGENCY MEDICINE

## 2017-01-01 PROCEDURE — 87077 CULTURE AEROBIC IDENTIFY: CPT | Performed by: FAMILY MEDICINE

## 2017-01-01 PROCEDURE — 99285 EMERGENCY DEPT VISIT HI MDM: CPT

## 2017-01-01 PROCEDURE — G8998 SWALLOW D/C STATUS: HCPCS

## 2017-01-01 PROCEDURE — 25010000002 VANCOMYCIN PER 500 MG: Performed by: FAMILY MEDICINE

## 2017-01-01 PROCEDURE — 71275 CT ANGIOGRAPHY CHEST: CPT | Performed by: RADIOLOGY

## 2017-01-01 PROCEDURE — 87186 SC STD MICRODIL/AGAR DIL: CPT | Performed by: PHYSICIAN ASSISTANT

## 2017-01-01 PROCEDURE — 82550 ASSAY OF CK (CPK): CPT | Performed by: EMERGENCY MEDICINE

## 2017-01-01 PROCEDURE — 80053 COMPREHEN METABOLIC PANEL: CPT | Performed by: FAMILY MEDICINE

## 2017-01-01 PROCEDURE — 93010 ELECTROCARDIOGRAM REPORT: CPT | Performed by: INTERNAL MEDICINE

## 2017-01-01 PROCEDURE — 51702 INSERT TEMP BLADDER CATH: CPT

## 2017-01-01 PROCEDURE — G8996 SWALLOW CURRENT STATUS: HCPCS

## 2017-01-01 PROCEDURE — 85610 PROTHROMBIN TIME: CPT | Performed by: FAMILY MEDICINE

## 2017-01-01 PROCEDURE — 85025 COMPLETE CBC W/AUTO DIFF WBC: CPT | Performed by: PHYSICIAN ASSISTANT

## 2017-01-01 PROCEDURE — 96365 THER/PROPH/DIAG IV INF INIT: CPT

## 2017-01-01 PROCEDURE — 84153 ASSAY OF PSA TOTAL: CPT | Performed by: NURSE PRACTITIONER

## 2017-01-01 PROCEDURE — 85007 BL SMEAR W/DIFF WBC COUNT: CPT | Performed by: PHYSICIAN ASSISTANT

## 2017-01-01 PROCEDURE — 80048 BASIC METABOLIC PNL TOTAL CA: CPT | Performed by: FAMILY MEDICINE

## 2017-01-01 PROCEDURE — 99214 OFFICE O/P EST MOD 30 MIN: CPT | Performed by: UROLOGY

## 2017-01-01 PROCEDURE — 81001 URINALYSIS AUTO W/SCOPE: CPT | Performed by: PHYSICIAN ASSISTANT

## 2017-01-01 PROCEDURE — 71010 HC CHEST PA OR AP: CPT

## 2017-01-01 PROCEDURE — 71275 CT ANGIOGRAPHY CHEST: CPT

## 2017-01-01 PROCEDURE — 87077 CULTURE AEROBIC IDENTIFY: CPT | Performed by: PHYSICIAN ASSISTANT

## 2017-01-01 PROCEDURE — 99213 OFFICE O/P EST LOW 20 MIN: CPT | Performed by: NURSE PRACTITIONER

## 2017-01-01 PROCEDURE — 84484 ASSAY OF TROPONIN QUANT: CPT | Performed by: EMERGENCY MEDICINE

## 2017-01-01 PROCEDURE — 85730 THROMBOPLASTIN TIME PARTIAL: CPT | Performed by: FAMILY MEDICINE

## 2017-01-01 RX ORDER — UREA 10 %
3 LOTION (ML) TOPICAL NIGHTLY
Status: CANCELLED | OUTPATIENT
Start: 2017-01-01

## 2017-01-01 RX ORDER — MIRTAZAPINE 15 MG/1
15 TABLET, FILM COATED ORAL NIGHTLY
Status: DISCONTINUED | OUTPATIENT
Start: 2017-01-01 | End: 2017-01-01 | Stop reason: HOSPADM

## 2017-01-01 RX ORDER — OSELTAMIVIR PHOSPHATE 75 MG/1
75 CAPSULE ORAL DAILY
COMMUNITY
Start: 2017-01-01 | End: 2017-01-01

## 2017-01-01 RX ORDER — LACTULOSE 10 G/15ML
15 SOLUTION ORAL DAILY PRN
Status: DISCONTINUED | OUTPATIENT
Start: 2017-01-01 | End: 2017-01-01 | Stop reason: HOSPADM

## 2017-01-01 RX ORDER — UREA 10 %
3 LOTION (ML) TOPICAL NIGHTLY
COMMUNITY

## 2017-01-01 RX ORDER — MAGNESIUM HYDROXIDE 1200 MG/15ML
LIQUID ORAL
Status: COMPLETED
Start: 2017-01-01 | End: 2017-01-01

## 2017-01-01 RX ORDER — ALUMINA, MAGNESIA, AND SIMETHICONE 2400; 2400; 240 MG/30ML; MG/30ML; MG/30ML
15 SUSPENSION ORAL EVERY 6 HOURS PRN
Status: DISCONTINUED | OUTPATIENT
Start: 2017-01-01 | End: 2017-01-01 | Stop reason: HOSPADM

## 2017-01-01 RX ORDER — MAGNESIUM HYDROXIDE 1200 MG/15ML
1000 LIQUID ORAL ONCE
Status: COMPLETED | OUTPATIENT
Start: 2017-01-01 | End: 2017-01-01

## 2017-01-01 RX ORDER — LACTULOSE 10 G/15ML
15 SOLUTION ORAL DAILY PRN
Status: CANCELLED | OUTPATIENT
Start: 2017-01-01

## 2017-01-01 RX ORDER — ASPIRIN 81 MG/1
81 TABLET ORAL DAILY
COMMUNITY

## 2017-01-01 RX ORDER — DOXYCYCLINE HYCLATE 100 MG
100 TABLET ORAL 2 TIMES DAILY
COMMUNITY

## 2017-01-01 RX ORDER — ASPIRIN 81 MG/1
81 TABLET ORAL DAILY
Status: DISCONTINUED | OUTPATIENT
Start: 2017-01-01 | End: 2017-01-01 | Stop reason: HOSPADM

## 2017-01-01 RX ORDER — SODIUM CHLORIDE 0.9 % (FLUSH) 0.9 %
10 SYRINGE (ML) INJECTION AS NEEDED
Status: DISCONTINUED | OUTPATIENT
Start: 2017-01-01 | End: 2017-01-01 | Stop reason: HOSPADM

## 2017-01-01 RX ORDER — OXYCODONE HYDROCHLORIDE AND ACETAMINOPHEN 5; 325 MG/1; MG/1
1 TABLET ORAL ONCE
Status: COMPLETED | OUTPATIENT
Start: 2017-01-01 | End: 2017-01-01

## 2017-01-01 RX ORDER — TRAMADOL HYDROCHLORIDE 50 MG/1
50 TABLET ORAL 2 TIMES DAILY PRN
COMMUNITY
End: 2017-01-01

## 2017-01-01 RX ORDER — ACETAMINOPHEN 500 MG
500 TABLET ORAL EVERY 6 HOURS PRN
Status: CANCELLED | OUTPATIENT
Start: 2017-01-01

## 2017-01-01 RX ORDER — ACETAMINOPHEN 500 MG
500 TABLET ORAL EVERY 6 HOURS PRN
COMMUNITY

## 2017-01-01 RX ORDER — ALBUTEROL SULFATE 2.5 MG/3ML
2.5 SOLUTION RESPIRATORY (INHALATION) EVERY 4 HOURS PRN
COMMUNITY
End: 2017-01-01

## 2017-01-01 RX ORDER — POLYETHYLENE GLYCOL 3350 17 G/17G
17 POWDER, FOR SOLUTION ORAL EVERY OTHER DAY
Status: DISCONTINUED | OUTPATIENT
Start: 2017-01-01 | End: 2017-01-01 | Stop reason: HOSPADM

## 2017-01-01 RX ORDER — ALUMINA, MAGNESIA, AND SIMETHICONE 2400; 2400; 240 MG/30ML; MG/30ML; MG/30ML
15 SUSPENSION ORAL EVERY 4 HOURS PRN
Status: CANCELLED | OUTPATIENT
Start: 2017-01-01

## 2017-01-01 RX ORDER — AMIODARONE HYDROCHLORIDE 200 MG/1
200 TABLET ORAL DAILY
Status: DISCONTINUED | OUTPATIENT
Start: 2017-01-01 | End: 2017-01-01 | Stop reason: HOSPADM

## 2017-01-01 RX ORDER — CASTOR OIL AND BALSAM, PERU 788; 87 MG/G; MG/G
OINTMENT TOPICAL EVERY 12 HOURS SCHEDULED
Status: DISCONTINUED | OUTPATIENT
Start: 2017-01-01 | End: 2017-01-01 | Stop reason: HOSPADM

## 2017-01-01 RX ORDER — MIRTAZAPINE 15 MG/1
15 TABLET, FILM COATED ORAL NIGHTLY
COMMUNITY

## 2017-01-01 RX ORDER — ASPIRIN 81 MG/1
81 TABLET ORAL DAILY
Status: CANCELLED | OUTPATIENT
Start: 2017-01-01

## 2017-01-01 RX ORDER — SULFAMETHOXAZOLE AND TRIMETHOPRIM 800; 160 MG/1; MG/1
1 TABLET ORAL 2 TIMES DAILY
Qty: 14 TABLET | Refills: 0 | Status: SHIPPED | OUTPATIENT
Start: 2017-01-01 | End: 2017-01-01

## 2017-01-01 RX ORDER — CIPROFLOXACIN 500 MG/1
500 TABLET, FILM COATED ORAL 2 TIMES DAILY
Qty: 20 TABLET | Refills: 0 | Status: SHIPPED | OUTPATIENT
Start: 2017-01-01 | End: 2017-01-01

## 2017-01-01 RX ORDER — ACETAMINOPHEN 325 MG/1
650 TABLET ORAL EVERY 6 HOURS PRN
Status: DISCONTINUED | OUTPATIENT
Start: 2017-01-01 | End: 2017-01-01 | Stop reason: HOSPADM

## 2017-01-01 RX ORDER — POLYETHYLENE GLYCOL 3350 17 G/17G
17 POWDER, FOR SOLUTION ORAL EVERY OTHER DAY
Status: CANCELLED | OUTPATIENT
Start: 2017-01-01

## 2017-01-01 RX ORDER — HEPARIN SODIUM 5000 [USP'U]/ML
5000 INJECTION, SOLUTION INTRAVENOUS; SUBCUTANEOUS EVERY 12 HOURS SCHEDULED
Status: DISCONTINUED | OUTPATIENT
Start: 2017-01-01 | End: 2017-01-01 | Stop reason: HOSPADM

## 2017-01-01 RX ORDER — LACTULOSE 10 G/15ML
15 SOLUTION ORAL DAILY PRN
COMMUNITY

## 2017-01-01 RX ORDER — LEVOTHYROXINE SODIUM 0.05 MG/1
50 TABLET ORAL DAILY
Status: CANCELLED | OUTPATIENT
Start: 2017-01-01

## 2017-01-01 RX ORDER — CEPHALEXIN 500 MG/1
500 CAPSULE ORAL 3 TIMES DAILY
Qty: 21 CAPSULE | Refills: 0 | Status: SHIPPED | OUTPATIENT
Start: 2017-01-01 | End: 2017-01-01

## 2017-01-01 RX ORDER — SODIUM CHLORIDE 9 MG/ML
75 INJECTION, SOLUTION INTRAVENOUS CONTINUOUS
Status: DISCONTINUED | OUTPATIENT
Start: 2017-01-01 | End: 2017-01-01 | Stop reason: HOSPADM

## 2017-01-01 RX ORDER — ACETAMINOPHEN AND CODEINE PHOSPHATE 300; 30 MG/1; MG/1
1 TABLET ORAL EVERY 4 HOURS PRN
Qty: 15 TABLET | Refills: 0 | Status: SHIPPED | OUTPATIENT
Start: 2017-01-01

## 2017-01-01 RX ORDER — CEFTRIAXONE 1 G/1
1 INJECTION, POWDER, FOR SOLUTION INTRAMUSCULAR; INTRAVENOUS EVERY 24 HOURS
Status: DISCONTINUED | OUTPATIENT
Start: 2017-01-01 | End: 2017-01-01 | Stop reason: HOSPADM

## 2017-01-01 RX ORDER — BICALUTAMIDE 50 MG/1
50 TABLET, FILM COATED ORAL DAILY
Qty: 14 TABLET | Refills: 0 | Status: SHIPPED | OUTPATIENT
Start: 2017-01-01 | End: 2017-01-01

## 2017-01-01 RX ORDER — ASPIRIN 325 MG
325 TABLET ORAL ONCE
Status: DISCONTINUED | OUTPATIENT
Start: 2017-01-01 | End: 2017-01-01

## 2017-01-01 RX ORDER — UREA 10 %
2 LOTION (ML) TOPICAL
COMMUNITY
End: 2017-01-01

## 2017-01-01 RX ORDER — DOXYCYCLINE 100 MG/1
100 CAPSULE ORAL 2 TIMES DAILY
Status: CANCELLED | OUTPATIENT
Start: 2017-01-01 | End: 2017-01-01

## 2017-01-01 RX ORDER — LIDOCAINE HYDROCHLORIDE 10 MG/ML
INJECTION, SOLUTION INFILTRATION; PERINEURAL
Status: DISCONTINUED
Start: 2017-01-01 | End: 2017-01-01 | Stop reason: HOSPADM

## 2017-01-01 RX ORDER — AMIODARONE HYDROCHLORIDE 200 MG/1
200 TABLET ORAL DAILY
Status: CANCELLED | OUTPATIENT
Start: 2017-01-01

## 2017-01-01 RX ORDER — LEVOTHYROXINE SODIUM 0.05 MG/1
50 TABLET ORAL
Status: DISCONTINUED | OUTPATIENT
Start: 2017-01-01 | End: 2017-01-01 | Stop reason: HOSPADM

## 2017-01-01 RX ORDER — ASPIRIN 81 MG/1
324 TABLET, CHEWABLE ORAL ONCE
Status: COMPLETED | OUTPATIENT
Start: 2017-01-01 | End: 2017-01-01

## 2017-01-01 RX ORDER — SODIUM CHLORIDE 0.9 % (FLUSH) 0.9 %
10 SYRINGE (ML) INJECTION AS NEEDED
Status: DISCONTINUED | OUTPATIENT
Start: 2017-01-01 | End: 2017-01-01

## 2017-01-01 RX ORDER — DOXYCYCLINE HYCLATE 100 MG/1
100 CAPSULE ORAL 2 TIMES DAILY
COMMUNITY
End: 2017-01-01

## 2017-01-01 RX ORDER — POLYETHYLENE GLYCOL 3350 17 G/17G
17 POWDER, FOR SOLUTION ORAL EVERY OTHER DAY
COMMUNITY

## 2017-01-01 RX ORDER — MIRTAZAPINE 15 MG/1
15 TABLET, FILM COATED ORAL NIGHTLY
Status: CANCELLED | OUTPATIENT
Start: 2017-01-01

## 2017-01-01 RX ADMIN — LEVOTHYROXINE SODIUM 50 MCG: 50 TABLET ORAL at 06:17

## 2017-01-01 RX ADMIN — DEXTROSE MONOHYDRATE 2 G: 50 INJECTION, SOLUTION INTRAVENOUS at 22:03

## 2017-01-01 RX ADMIN — CEFTRIAXONE SODIUM 1 G: 1 INJECTION, POWDER, FOR SOLUTION INTRAMUSCULAR; INTRAVENOUS at 21:06

## 2017-01-01 RX ADMIN — ASPIRIN 81 MG: 81 TABLET ORAL at 08:47

## 2017-01-01 RX ADMIN — VANCOMYCIN HYDROCHLORIDE 1750 MG: 5 INJECTION, POWDER, LYOPHILIZED, FOR SOLUTION INTRAVENOUS at 22:21

## 2017-01-01 RX ADMIN — OXYCODONE HYDROCHLORIDE AND ACETAMINOPHEN 1 TABLET: 5; 325 TABLET ORAL at 17:12

## 2017-01-01 RX ADMIN — MIRTAZAPINE 15 MG: 15 TABLET, FILM COATED ORAL at 03:15

## 2017-01-01 RX ADMIN — CASTOR OIL AND BALSAM, PERU: 788; 87 OINTMENT TOPICAL at 12:42

## 2017-01-01 RX ADMIN — DEXTROSE MONOHYDRATE 1 G: 50 INJECTION, SOLUTION INTRAVENOUS at 14:53

## 2017-01-01 RX ADMIN — MAGNESIUM HYDROXIDE 1000 ML: 1200 LIQUID ORAL at 09:30

## 2017-01-01 RX ADMIN — DEXTROSE MONOHYDRATE 1 G: 50 INJECTION, SOLUTION INTRAVENOUS at 05:29

## 2017-01-01 RX ADMIN — MIRTAZAPINE 15 MG: 15 TABLET, FILM COATED ORAL at 20:28

## 2017-01-01 RX ADMIN — SODIUM CHLORIDE 75 ML/HR: 9 INJECTION, SOLUTION INTRAVENOUS at 05:06

## 2017-01-01 RX ADMIN — ASPIRIN 324 MG: 81 TABLET, CHEWABLE ORAL at 17:16

## 2017-01-01 RX ADMIN — IOPAMIDOL 100 ML: 755 INJECTION, SOLUTION INTRAVENOUS at 20:30

## 2017-01-01 RX ADMIN — ASPIRIN 81 MG: 81 TABLET ORAL at 12:41

## 2017-01-01 RX ADMIN — AMIODARONE HYDROCHLORIDE 200 MG: 200 TABLET ORAL at 08:47

## 2017-01-01 RX ADMIN — POLYETHYLENE GLYCOL (3350) 17 G: 17 POWDER, FOR SOLUTION ORAL at 08:47

## 2017-01-01 RX ADMIN — AZTREONAM 1 G: 1 INJECTION, POWDER, FOR SOLUTION INTRAMUSCULAR; INTRAVENOUS at 05:05

## 2017-01-01 RX ADMIN — VANCOMYCIN HYDROCHLORIDE 1250 MG: 5 INJECTION, POWDER, LYOPHILIZED, FOR SOLUTION INTRAVENOUS at 10:28

## 2017-01-01 RX ADMIN — AZTREONAM 1 G: 1 INJECTION, POWDER, FOR SOLUTION INTRAMUSCULAR; INTRAVENOUS at 22:24

## 2017-01-01 RX ADMIN — CASTOR OIL AND BALSAM, PERU: 788; 87 OINTMENT TOPICAL at 09:00

## 2017-01-01 RX ADMIN — WATER 1000 ML: 100 IRRIGANT IRRIGATION at 09:30

## 2017-01-01 RX ADMIN — CASTOR OIL AND BALSAM, PERU: 788; 87 OINTMENT TOPICAL at 20:29

## 2017-01-01 RX ADMIN — CEFTRIAXONE 1 G: 1 INJECTION, POWDER, FOR SOLUTION INTRAMUSCULAR; INTRAVENOUS at 19:37

## 2017-01-01 RX ADMIN — VANCOMYCIN HYDROCHLORIDE 1250 MG: 5 INJECTION, POWDER, LYOPHILIZED, FOR SOLUTION INTRAVENOUS at 17:35

## 2017-01-01 RX ADMIN — SODIUM CHLORIDE 75 ML/HR: 9 INJECTION, SOLUTION INTRAVENOUS at 02:00

## 2017-01-01 RX ADMIN — AMIODARONE HYDROCHLORIDE 200 MG: 200 TABLET ORAL at 12:41

## 2017-01-01 RX ADMIN — HEPARIN SODIUM 5000 UNITS: 5000 INJECTION, SOLUTION INTRAVENOUS; SUBCUTANEOUS at 20:29

## 2017-01-01 RX ADMIN — LEVOTHYROXINE SODIUM 50 MCG: 50 TABLET ORAL at 12:41

## 2017-01-01 RX ADMIN — HEPARIN SODIUM 5000 UNITS: 5000 INJECTION, SOLUTION INTRAVENOUS; SUBCUTANEOUS at 12:42

## 2017-01-01 RX ADMIN — HEPARIN SODIUM 5000 UNITS: 5000 INJECTION, SOLUTION INTRAVENOUS; SUBCUTANEOUS at 08:47

## 2017-01-20 NOTE — ED NOTES
THIS NURSE SPOKE WITH [facility]. POA GAVE CONSENT TO TREAT PT AT THIS FACILITY, POA STATES THAT HE CAN ANY TREATMENT NEEDED EXCEPT FOR LIFE SUSTAINING TREATMENT DUE TO PT BEING A DNR, ANY ESPINAL PA-C MADE AWARE     Analisa Oquendo, LUIS  01/20/17 9399

## 2017-01-20 NOTE — ED PROVIDER NOTES
Subjective   HPI Comments: 89-year-old male sent from [facility] for urinary retention.  The patient has a chronic indwelling Hernandez catheter.  They state it became obstructed today and he was not able to get any urine out.  They removed the catheter and place another catheter but states still wasn't having any urine.  They have sent him here for an evaluation.    Patient is a 89 y.o. male presenting with dysuria.   History provided by:  Patient and nursing home  Difficulty Urinating   Presenting symptoms: dysuria    Presenting symptoms comment:  Urinary retention  Context: spontaneously    Worsened by:  Nothing  Associated symptoms: abdominal pain and urinary retention    Associated symptoms: no diarrhea, no fever, no flank pain, no nausea, no scrotal swelling and no vomiting    Risk factors: urinary catheter        Review of Systems   Constitutional: Negative for fever.   HENT: Negative.    Eyes: Negative.    Respiratory: Negative.    Cardiovascular: Negative.    Gastrointestinal: Positive for abdominal pain. Negative for diarrhea, nausea and vomiting.   Genitourinary: Positive for decreased urine volume, difficulty urinating and dysuria. Negative for flank pain and scrotal swelling.   Musculoskeletal: Negative.    Skin: Negative.    Neurological: Negative.    Psychiatric/Behavioral: Negative.    All other systems reviewed and are negative.      Past Medical History   Diagnosis Date   • Atrial fibrillation 11/06/2014   • BPH (benign prostatic hyperplasia)    • CAD (coronary artery disease)    • Cancer    • Diabetes    • Disease of thyroid gland    • Dyslipidemia    • Gastroesophageal reflux disease    • Hypertension    • Prostate cancer      20 years    • Restless leg syndrome    • TIA (transient ischemic attack)      History of Bell’s palsy.       Allergies   Allergen Reactions   • Isosorbide Mononitrate [Isosorbide]       dizziness/severe weakness       Past Surgical History   Procedure Laterality  Date   • Cholecystectomy       remote    • Cardiac catheterization     • Coronary stent placement  12/01/2005     LÁZARO LAD       Family History   Problem Relation Age of Onset   • Cancer Sister    • Cancer Brother        Social History     Social History   • Marital status:      Spouse name: N/A   • Number of children: N/A   • Years of education: N/A     Social History Main Topics   • Smoking status: Never Smoker   • Smokeless tobacco: Never Used   • Alcohol use No   • Drug use: No   • Sexual activity: Defer     Other Topics Concern   • None     Social History Narrative           Objective   Physical Exam   Constitutional: He is oriented to person, place, and time. He appears well-developed and well-nourished. He appears distressed (appears to be in pain).   HENT:   Head: Normocephalic and atraumatic.   Right Ear: External ear normal.   Left Ear: External ear normal.   Nose: Nose normal.   Mouth/Throat: Oropharynx is clear and moist.   Eyes: Conjunctivae and EOM are normal. Pupils are equal, round, and reactive to light.   Cardiovascular: Regular rhythm, normal heart sounds and intact distal pulses.  Tachycardia present.    Pulmonary/Chest: Effort normal and breath sounds normal. No respiratory distress.   Abdominal: Soft. Bowel sounds are normal. He exhibits distension. There is tenderness (moderate over the suprapubic area, distended over the bladder area).   Musculoskeletal: Normal range of motion.   Neurological: He is alert and oriented to person, place, and time.   Skin: Skin is warm and dry.   Psychiatric: His mood appears anxious.   Nursing note and vitals reviewed.      Procedures         ED Course  ED Course   Comment By Time   Hernandez catheter placed by the nurse with 1000 mL of urine return. RAFAEL Anglin 01/20 1309   Pt is feeling much better since catheter has been placed.  Will discharge back to the nursing home to follow up outpatient as needed. RAFAEL Anglin 01/20 8711                   MDM  Number of Diagnoses or Management Options  Urinary retention due to benign prostatic hyperplasia:      Amount and/or Complexity of Data Reviewed  Clinical lab tests: reviewed and ordered    Patient Progress  Patient progress: improved      Final diagnoses:   Urinary retention due to benign prostatic hyperplasia            RAFAEL Anglin  01/20/17 1555

## 2017-01-20 NOTE — DISCHARGE INSTRUCTIONS

## 2017-01-20 NOTE — ED NOTES
[facility] called to arrange patient transport back to [facility].      Susan Jhaveri  01/20/17 8821

## 2017-02-12 NOTE — ED PROVIDER NOTES
"Subjective   History of Present Illness  89 y/o M w/ chronic indwelling steven catheter presenting with hematuria. Pt has a h/o UTI and catheter obstruction and has been seen here for that in the past. Pt was a nursing home and had gross hematuria after the staff \"adjusted\" his steven catheter w/ overall decrease in urinary output. Pt has h/o urinary obstruction 2/2 BPH and had ROGERIO that resolved after placement of steven catheter. Pt has dementia and is pleasantly demented at baseline per history.   Review of Systems   Constitutional: Negative for activity change, appetite change, chills and fever.   HENT: Negative for trouble swallowing and voice change.    Respiratory: Negative for apnea, cough, choking, chest tightness, wheezing and stridor.    Cardiovascular: Negative for chest pain, palpitations and leg swelling.   Gastrointestinal: Negative for abdominal distention, abdominal pain, diarrhea, nausea and vomiting.   Genitourinary: Positive for decreased urine volume, difficulty urinating, dysuria and hematuria. Negative for flank pain, frequency and urgency.   Musculoskeletal: Negative for arthralgias, back pain, gait problem, joint swelling and myalgias.   Neurological: Negative for dizziness, seizures, facial asymmetry, speech difficulty, light-headedness, numbness and headaches.       Past Medical History   Diagnosis Date   • Atrial fibrillation 11/06/2014   • BPH (benign prostatic hyperplasia)    • CAD (coronary artery disease)    • Cancer    • Diabetes    • Disease of thyroid gland    • Dyslipidemia    • Gastroesophageal reflux disease    • Hypertension    • Prostate cancer      20 years    • Restless leg syndrome    • TIA (transient ischemic attack)      History of Bell’s palsy.       Allergies   Allergen Reactions   • Isosorbide Mononitrate [Isosorbide]       dizziness/severe weakness       Past Surgical History   Procedure Laterality Date   • Cholecystectomy       remote    • Cardiac catheterization     • " Coronary stent placement  12/01/2005     LÁZARO ZEESHAN       Family History   Problem Relation Age of Onset   • Cancer Sister    • Cancer Brother        Social History     Social History   • Marital status:      Spouse name: N/A   • Number of children: N/A   • Years of education: N/A     Social History Main Topics   • Smoking status: Never Smoker   • Smokeless tobacco: Never Used   • Alcohol use No   • Drug use: No   • Sexual activity: Defer     Other Topics Concern   • None     Social History Narrative           Objective   Physical Exam   Constitutional: He appears well-developed and well-nourished. He is active.   HENT:   Head: Normocephalic and atraumatic.   Right Ear: Hearing and external ear normal.   Left Ear: Hearing and external ear normal.   Nose: Nose normal.   Mouth/Throat: Uvula is midline and oropharynx is clear and moist.   Eyes: Conjunctivae, EOM and lids are normal. Pupils are equal, round, and reactive to light.   Neck: Trachea normal, normal range of motion, full passive range of motion without pain and phonation normal. Neck supple.   Cardiovascular: Normal rate, regular rhythm and normal heart sounds.    Pulmonary/Chest: Effort normal and breath sounds normal.   Abdominal: Soft. Normal appearance. There is no tenderness.   Genitourinary: Penis normal. Circumcised.         Neurological: He is alert. He is disoriented.   Skin: Skin is warm, dry and intact.   Psychiatric: He has a normal mood and affect. His behavior is normal. Judgment and thought content normal.   Nursing note and vitals reviewed.      Procedures         ED Course  ED Course    Pt's steven catheter replaced with large clot that reportedly came out followed by large amount of urine. UA revealed a UTI for which pt was given 1gm Rocephin IM here in the ED. I spoke with the pt's nurse who stated that Dr Allen is aware of the pt's UTI and as long as pt is afebrile and without leukocytosis he would continue to watch it as an outpt.  Nursing staff okay with transport of pt back to facility.               MDM  Number of Diagnoses or Management Options  Benign prostatic hyperplasia with urinary obstruction: established and worsening  Complication of Hernandez catheter, initial encounter: new and requires workup     Amount and/or Complexity of Data Reviewed  Clinical lab tests: ordered and reviewed    Risk of Complications, Morbidity, and/or Mortality  Presenting problems: high  Diagnostic procedures: high  Management options: high    Patient Progress  Patient progress: improved      Final diagnoses:   Benign prostatic hyperplasia with urinary obstruction   Complication of Hernandez catheter, initial encounter            Praful Valle MD  02/11/17 6611

## 2017-02-12 NOTE — ED NOTES
Contacted [facility] about transporting pt. Back to [facility], will accept and send crew. RN aware.     Helena Langley  02/11/17 2050

## 2017-02-22 NOTE — PROGRESS NOTES
Chief Complaint:          Chief Complaint   Patient presents with   • Prostate Cancer   • Urinary Retention       HPI:   89 y.o. male.      89-year-old wheelchair dependent moribund-appearing white male accompanied by 2 daughters.  They do not really have any information regarding his history and I spoke by telephone this uncle Karthik.  He had the diagnosis of prostate cancer 15+ years ago by Dr. powell it was low-grade.  He then saw Dr. Vidal he had a flap in the prostate but he was not a candidate for intervention.  He then was transferred to Dr. Anton Sandoval who initiated therapy with Lupron and Casodex did a total body bone scan which was positive for metastatic disease there would like to continue the Lupron injections here they're concerned about blood in the urine and 4 visits to emergency room for an obstructed catheter mirabilis urinary tract infection.  In addition to these problems he has multiple other problems.  Including coronary artery disease retention renal failure atrial fibrillation restless leg syndrome weight loss.  I reviewed his information extensively as well as his laboratories and had a long discussion with the family I'm going to recommend a stone CT is I believe he probably has a stone in the urinary tract because of the recurrent Proteus infections getting the records from Dr. Walton so we can initiate appropriate Lupron therapy according to the right schedule and a PSA to stage worries at today    Past Medical History:        Past Medical History   Diagnosis Date   • Atrial fibrillation 11/06/2014   • BPH (benign prostatic hyperplasia)    • CAD (coronary artery disease)    • Cancer    • Diabetes    • Disease of thyroid gland    • Dyslipidemia    • Gastroesophageal reflux disease    • Hypertension    • Prostate cancer      20 years    • Restless leg syndrome    • TIA (transient ischemic attack)      History of Bell’s palsy.         Current Meds:     Current Outpatient Prescriptions    Medication Sig Dispense Refill   • amiodarone (PACERONE) 200 MG tablet Take 200 mg by mouth daily.     • aspirin 81 MG chewable tablet Chew 81 mg daily.     • lactulose (CHRONULAC) 10 GM/15ML solution Take 20 g by mouth 2 (Two) Times a Day As Needed.     • levothyroxine (SYNTHROID, LEVOTHROID) 50 MCG tablet Take 50 mcg by mouth daily.     • megestrol (MEGACE) 40 MG/ML suspension Take 200 mg by mouth 2 (Two) Times a Day.     • polyethylene glycol (MIRALAX) packet Take 17 g by mouth Daily.       No current facility-administered medications for this visit.         Allergies:      Allergies   Allergen Reactions   • Isosorbide Mononitrate [Isosorbide]       dizziness/severe weakness        Past Surgical History:     Past Surgical History   Procedure Laterality Date   • Cholecystectomy       remote    • Cardiac catheterization     • Coronary stent placement  12/01/2005     LÁZARO LAD         Social History:     Social History     Social History   • Marital status:      Spouse name: N/A   • Number of children: N/A   • Years of education: N/A     Occupational History   • Not on file.     Social History Main Topics   • Smoking status: Never Smoker   • Smokeless tobacco: Never Used   • Alcohol use No   • Drug use: No   • Sexual activity: Defer     Other Topics Concern   • Not on file     Social History Narrative       Family History:     Family History   Problem Relation Age of Onset   • Cancer Sister    • Cancer Brother        Review of Systems:     Review of Systems   Constitutional: Negative.    HENT: Negative.    Eyes: Negative.    Respiratory: Negative.    Cardiovascular: Negative.    Gastrointestinal: Negative.    Endocrine: Negative.    Genitourinary: Negative.    Musculoskeletal: Negative.    Allergic/Immunologic: Negative.    Neurological: Negative.    Hematological: Negative.    Psychiatric/Behavioral: Negative.        Physical Exam:     Physical Exam   Constitutional: He is oriented to person, place, and  time.   HENT:   Head: Normocephalic and atraumatic.   Eyes: Conjunctivae and EOM are normal. Pupils are equal, round, and reactive to light.   Neck: Normal range of motion.   Cardiovascular: Normal rate, regular rhythm, normal heart sounds and intact distal pulses.    Pulmonary/Chest: Effort normal and breath sounds normal.   Abdominal: Soft. Bowel sounds are normal.   Genitourinary:   Genitourinary Comments: Moribund-appearing white male with Hernandez catheter to gravity drainage.   Musculoskeletal: Normal range of motion.   Neurological: He is alert and oriented to person, place, and time. He has normal reflexes.   Skin: Skin is warm and dry.   Psychiatric: He has a normal mood and affect. His behavior is normal. Judgment and thought content normal.   Nursing note and vitals reviewed.      Procedure:       Assessment:   No diagnosis found.  No orders of the defined types were placed in this encounter.      Plan:   Metastatic prostate cancer here to resume hormonal therapy discussed with him at great length.  I spoke with the Karthik the uncle who seems to have the most information in this regard once a get the information from [facility] I can resume his Lupron injections  Urinary tract infection-is a Proteus species strongly associated with urolithiasis I'm going to go ahead and obtain a stone CT as this is the least invasive therapy and proceed in this fashion           This document has been electronically signed by JOELLEN ALCOCER MD February 22, 2017 11:35 AM

## 2017-03-15 NOTE — PROGRESS NOTES
Encounter Date:03/15/2017    Patient ID: Michoacano Bravo is a 89 y.o. male who resides at the [facility] in [place], [place].    CC/Reason for visit:  Coronary Artery Disease (6 month follow up); Hyperlipidemia; Atrial Fibrillation; and Hypertension          Michoacano Bravo returns today for 6 month follow-up for his coronary artery disease, hyperlipidemia, paroxysmal atrial fibrillation and hypertension.  At his last visit Mr. bravo had informed us how he had been diagnosed with prostate cancer that metastasized to his bones.  At her last visit the patient still lived at home and was able to ambulate and required minimal assistance by his family but he has now been relocated to the [facility].  He was actually hospitalized this last November with urinary retention and found to be in acute renal failure.  He also had altered mental status most likely due to an encephalopathy and according to the son the patient has not ambulated since this visit.  The family was unable to assist him with bathing and toileting with his functional decline which were the main reasons they relocated him to a local nursing home. The patient has been given only 6 months to live by his urologist but they are discussing have a urinary catheter placed permanently but the son is unsure if they want to proceed with this.  The patient has discontinued his Elquis was due to excessive bruising and bleeding but he is still currently taking an 81 mg aspirin.  He is currently on Cipro for his UTI as well.  At his last visit we recommended to his primary care provider that his Megace be restarted to assist with increasing his appetite.  The son informs me today that the medication has gotten too expensive and they are unable to purchase.  The son also informs me that he does not feel his father will be able to make the trip to [place] for any future follow-ups due to his declining condition.      Review of Systems   All other systems  "reviewed and are negative.      The patient's past medical, social, and family history reviewed in the patient's electronic medical record.    Allergies  Isosorbide mononitrate [isosorbide nitrate]    Outpatient Prescriptions Marked as Taking for the 3/15/17 encounter (Office Visit) with Michoacano Ortiz MD   Medication Sig Dispense Refill   • acetaminophen (TYLENOL) 500 MG tablet Take 500 mg by mouth Every 6 (Six) Hours As Needed for Mild Pain (1-3).     • albuterol (PROVENTIL) (2.5 MG/3ML) 0.083% nebulizer solution Take 2.5 mg by nebulization Every 4 (Four) Hours As Needed for Wheezing.     • amiodarone (PACERONE) 200 MG tablet Take 200 mg by mouth daily.     • aspirin 81 MG chewable tablet Chew 81 mg daily.     • ciprofloxacin (CIPRO) 500 MG tablet Take 1 tablet by mouth 2 (Two) Times a Day. 20 tablet 0   • lactulose (CHRONULAC) 10 GM/15ML solution Take 20 g by mouth 2 (Two) Times a Day As Needed.     • levothyroxine (SYNTHROID, LEVOTHROID) 50 MCG tablet Take 50 mcg by mouth daily.     • melatonin 1 MG tablet Take 2 mg by mouth.     • polyethylene glycol (MIRALAX) packet Take 17 g by mouth Daily.     • traMADol (ULTRAM) 50 MG tablet Take 50 mg by mouth 2 (Two) Times a Day As Needed for moderate pain (4-6).           Blood pressure 116/64, pulse 81, height 71\" (180.3 cm), weight 180 lb (81.6 kg).  Body mass index is 25.1 kg/(m^2).    Physical Exam   Constitutional: He is oriented to person, place, and time. He appears well-developed and well-nourished.   HENT:   Head: Normocephalic and atraumatic.   Eyes: Pupils are equal, round, and reactive to light. No scleral icterus.   Neck: No JVD present. Carotid bruit is not present. No thyromegaly present.   Cardiovascular: Normal rate, regular rhythm, S1 normal and S2 normal.  Exam reveals no gallop.    No murmur heard.  Pulmonary/Chest: Effort normal and breath sounds normal.   Abdominal: Soft. There is no tenderness.   Neurological: He is alert and oriented to " person, place, and time.   Skin: Skin is warm and dry. Bruising and ecchymosis noted. No cyanosis. Nails show no clubbing.   Psychiatric: He has a normal mood and affect. His behavior is normal.       Data Review:   Procedures         Problem List Items Addressed This Visit        Cardiovascular and Mediastinum    Essential hypertension    Current Assessment & Plan     · Currently controlled will reassess at next visit         Coronary artery disease involving native coronary artery of native heart without angina pectoris    Overview     · Cardiac catheterization (12/1/2005): Significant LAD disease status post LÁZARO.  Normal LVEF  · Echocardiogram, 06/08/2011:  LVEF (60%), LV diastolic dysfunction.  · Nuclear stress test (6/08/2011):  no reversible ischemia; LVEF (50%)         Current Assessment & Plan     · Continue aspirin 81 mg daily         Paroxysmal atrial fibrillation - Primary    Overview     · Chads VASC = 7         Current Assessment & Plan     · Continue amiodarone 200 mg daily         Hyperlipidemia LDL goal <70    Current Assessment & Plan     · Given the patient's advanced age would not recommend statin therapy                    · Continue aspirin 81 mg daily and amiodarone 200 mg daily.  · Agree with discontinuation of Eliquis  · The patient's son may call if they desire any future follow-up or have any questions.    Elizabeth ALVARES evaluated and treated this patient independently    DIA Hassan  3/15/2017

## 2017-03-22 NOTE — PROGRESS NOTES
Chief Complaint:          Chief Complaint   Patient presents with   • Prostate Cancer       HPI:   89 y.o. male.    87-year-old white male who had his care transferred from the [facility] for follow-up of prostate cancer he was last seen there on March 10, 2015 for follow-up cancer he was treated with an injection of Lupron.  He been on Casodex his PSA was 4.4 he had no weight loss or bone pain.  He was on Eliquis for the recent onset of atrial fibrillation.  His urinalysis at that time was negative.  His PSA was 26.6 and he was treated with a shot of Eligard.  He was then seen on April 21.  With no change and that's when he got his Eligard.  He has not had one since we discussed the situation with his caregiver at length.  Given his age and state of health I think a Hernandez catheter and treatment with minimal hormonal blockade is most appropriate in this juncture I'll see him back and we'll follow-up closely we have a PSA pending at the time of this dictation.  Always records are scanned in the patient's chart      Past Medical History:        Past Medical History:   Diagnosis Date   • Atrial fibrillation 11/06/2014   • BPH (benign prostatic hyperplasia)    • CAD (coronary artery disease)    • Cancer    • Diabetes    • Disease of thyroid gland    • Dyslipidemia    • Gastroesophageal reflux disease    • Hypertension    • Prostate cancer     20 years    • Restless leg syndrome    • TIA (transient ischemic attack)     History of Bell’s palsy.         Current Meds:     Current Outpatient Prescriptions   Medication Sig Dispense Refill   • acetaminophen (TYLENOL) 500 MG tablet Take 500 mg by mouth Every 6 (Six) Hours As Needed for Mild Pain (1-3).     • albuterol (PROVENTIL) (2.5 MG/3ML) 0.083% nebulizer solution Take 2.5 mg by nebulization Every 4 (Four) Hours As Needed for Wheezing.     • amiodarone (PACERONE) 200 MG tablet Take 200 mg by mouth daily.     • aspirin 81 MG chewable tablet Chew 81 mg daily.     •  ciprofloxacin (CIPRO) 500 MG tablet Take 1 tablet by mouth 2 (Two) Times a Day. 20 tablet 0   • lactulose (CHRONULAC) 10 GM/15ML solution Take 20 g by mouth 2 (Two) Times a Day As Needed.     • levothyroxine (SYNTHROID, LEVOTHROID) 50 MCG tablet Take 50 mcg by mouth daily.     • megestrol (MEGACE) 40 MG/ML suspension Take 200 mg by mouth 2 (Two) Times a Day.     • melatonin 1 MG tablet Take 2 mg by mouth.     • oseltamivir (TAMIFLU) 75 MG capsule Take 75 mg by mouth Daily.     • polyethylene glycol (MIRALAX) packet Take 17 g by mouth Daily.     • traMADol (ULTRAM) 50 MG tablet Take 50 mg by mouth 2 (Two) Times a Day As Needed for moderate pain (4-6).       No current facility-administered medications for this visit.         Allergies:      Allergies   Allergen Reactions   • Isosorbide Mononitrate [Isosorbide Nitrate]       dizziness/severe weakness        Past Surgical History:     Past Surgical History:   Procedure Laterality Date   • CARDIAC CATHETERIZATION     • CHOLECYSTECTOMY      remote    • CORONARY STENT PLACEMENT  12/01/2005    LÁZARO LAD         Social History:     Social History     Social History   • Marital status:      Spouse name: N/A   • Number of children: N/A   • Years of education: N/A     Occupational History   • Not on file.     Social History Main Topics   • Smoking status: Never Smoker   • Smokeless tobacco: Never Used   • Alcohol use No   • Drug use: No   • Sexual activity: Defer     Other Topics Concern   • Not on file     Social History Narrative       Family History:     Family History   Problem Relation Age of Onset   • Cancer Sister    • Cancer Brother        Review of Systems:     Review of Systems   Constitutional: Negative.    HENT: Negative.    Eyes: Negative.    Respiratory: Negative.    Cardiovascular: Negative.    Gastrointestinal: Negative.    Endocrine: Negative.    Genitourinary: Negative.    Musculoskeletal: Negative.    Allergic/Immunologic: Negative.    Neurological:  Negative.    Hematological: Negative.    Psychiatric/Behavioral: Negative.        Physical Exam:     Physical Exam   Constitutional: He is oriented to person, place, and time. He appears well-developed and well-nourished.   HENT:   Head: Normocephalic and atraumatic.   Eyes: Conjunctivae and EOM are normal. Pupils are equal, round, and reactive to light.   Neck: Normal range of motion.   Cardiovascular: Normal rate, regular rhythm, normal heart sounds and intact distal pulses.    Pulmonary/Chest: Effort normal and breath sounds normal.   Abdominal: Soft. Bowel sounds are normal.   Genitourinary:   Genitourinary Comments: Wheelchair dependent elderly white male   Musculoskeletal: Normal range of motion.   Neurological: He is alert and oriented to person, place, and time. He has normal reflexes.   Skin: Skin is warm and dry.   Psychiatric: He has a normal mood and affect. His behavior is normal. Judgment and thought content normal.   Nursing note and vitals reviewed.      Procedure:       Assessment:   No diagnosis found.  No orders of the defined types were placed in this encounter.      Plan:   Prostate cancer status post a Lupron injection.  A PSAs.  He currently has mildly antigen insensitive disease will continue to watch him given his other numerous comorbidities I doubt this will ever be an issue for him           This document has been electronically signed by JOELLEN ALCOCER MD March 22, 2017 9:00 AM

## 2017-07-05 NOTE — MBS/VFSS/FEES
Outpatient Speech Language Pathology   Adult Swallow Initial Evaluation   Kai   OUTPATIENT MODIFIED BARIUM SWALLOW STUDY     Patient Name: Michoacano Anna  : 12/3/1919  MRN: [id number]  Today's Date: 2017      Michoacano Anna  presents to the radiology suite this am from [facility] to participate in an instrumental MBS to evaluate safety/efficacy of swallowing fnx, determine safest/least restrictive diet. He is accompanied by his son, Karthik.      Chest xray is not available for review.     Mr. Anna is observed on ra w/o complications.     Risks and benefits of the procedure are explained w/ pt and son verbalizing understanding/agreement to participate.     Mr. Anna is positioned upright and centered in a wheel chair to accept multiple po presentations of solid cracker, puree, honey thick, nectar thick, and thin liquids via spoon, cup and straw, along w/ whole placebo pill in puree. He is able to self feed.     All views are from the lateral plane.     Facial/oral structures are symmetrical upon observation w/o lingual deviation upon protrusion. Oral mucosa are moist, pink and clean. Secretions are clear, thin and well controlled. OROM/JAVI is generally weak w/ slightly delayed imitation of oral postures. Gag is not assessed. Volitional cough is adequate in intensity, clear in quality, nonproductive. Vocal quality is adequate in intensity, clear in quality w/ intelligible speech. He is a/a and cooperative to participate, oriented to person, follows simple directives, and participates in simple conversational exchanges. He is noted w/ a severe bilateral HL w/o amplification devices.     Upon po presentations, adequate bolus anticipation w/ good labial seal for bolus clearance via spoon bowl, cup rim stability and suction via straw.  Bolus formation, manipulation,  and control are generally weak w/ increased oral prep time w/ solids w/mixed phasic- rotary mastication pattern. Mild lingual  pumping w/ solids and puree w/ slightly delayed a-p transit, w/o oral residue. Tongue base retraction and linguavelar seal are adequate, however, poor bolus control of cup presentations leads to premature spillage of nectar thick and thin liquids. No laryngeal penetration or aspiration is evidenced before the swallow.     Pharyngeal swallow is timely w/ adequate hyolaryngeal elevation and epiglottic inversion. Pharyngeal contraction is adequate w/o significant residue. Transient laryngeal penetration of honey thick liquids via large cup bolus, clearing upon completion of the swallow w/o significant residue. Cup bolus of nectar and thin again lead to laryngeal penetration during the swallow w/ nectar thick liquids clearing w/o residue. Thin liquids w/ micro-silent aspiration during and after the swallow 2/2 laryngeal residue. Cued cough is effective to clear aspirated material. Compensatory techniques of chin tuck, bolus size control via 10cc Provale cup are effective to fully alleviate laryngeal penetration and aspiration.  No other laryngeal penetration or aspiration evidenced during or after the swallow.     Partial esophageal sweep reveals no mucosal abnormalities. Motility is wfl w/o retrograde flow noted.      Impression: Mr. Anna presents w/ a mild oropharyngeal dysphagia w/ poor bolus control w/ cup liquids, leading to micro silent aspiration of thin liquids only. However, he has no recent h/o recurrent bronchitis or PNA, no recent hospitalizations, per his family. Laryngeal penetration and aspriation are alleviated w/ compensatory techniques of chin tuck/ bolus size control via adaptive 10cc Provale cup. Considering no h/o respiratory effects of dysphagia, advanced age, renal deficits w/ risk of complications w/ dehydration, he is felt to most benefit from continuation of least restrictive po diet of mechanical soft, ground meats, thin liquids w/ all liquids via 10cc Provale cup, or monitored chin tuck.      Recommendations:   1. Mechanical soft diet, ground meats, thin liquids. ALL liquids via 10cc Provale cup or w/ monitored chin tuck for bolus size control.    2. Meds crushed in puree/thins.   3. Universal aspiration precautions w/ upright positioning for all po intake, specifically meals.   4. LUKE precautions.  5. Oral care protocol.     D/w pt and his son results and recommendations w/ verbal understanding and agreement.     Thank you for allowing me to participate in the care of your patient-  Analisa Green M.S., CCC/SLP       Visit Date: 07/05/2017   Patient Active Problem List   Diagnosis   • Benign prostatic hyperplasia with urinary obstruction   • Elevated prostate specific antigen (PSA)   • Malignant neoplasm of prostate   • Retention of urine   • Essential hypertension   • Type 2 diabetes mellitus without complication, without long-term current use of insulin   • Hypothyroid   • Coronary artery disease involving native coronary artery of native heart without angina pectoris   • Paroxysmal atrial fibrillation   • Hyperlipidemia LDL goal <70   • TIA (transient ischemic attack)   • Restless leg syndrome   • Gastroesophageal reflux disease   • Disease of thyroid gland   • BPH (benign prostatic hyperplasia)   • Acute renal failure        Past Medical History:   Diagnosis Date   • Atrial fibrillation 11/06/2014   • BPH (benign prostatic hyperplasia)    • CAD (coronary artery disease)    • Cancer    • Diabetes    • Disease of thyroid gland    • Dyslipidemia    • Gastroesophageal reflux disease    • Hypertension    • Prostate cancer     20 years    • Restless leg syndrome    • TIA (transient ischemic attack)     History of Bell’s palsy.        Past Surgical History:   Procedure Laterality Date   • CARDIAC CATHETERIZATION     • CHOLECYSTECTOMY      remote    • CORONARY STENT PLACEMENT  12/01/2005    LÁZARO LAD     Visit Dx:     ICD-10-CM ICD-9-CM   1. Dysphagia, unspecified type R13.10 787.20       Time Calculation:    SLP Start Time: 1000  SLP Stop Time: 1100  SLP Time Calculation (min): 60 min  SLP Non-Billable Time (min): 30 min    Therapy Charges for Today     Code Description Service Date Service Provider Modifiers Qty    28405910311 HC ST SWALLOWING CURRENT STATUS 7/5/2017 Analisa Green, MS CCC-SLP GN, CI 1    76904667026 HC ST SWALLOWING PROJECTED 7/5/2017 Analisa Green MS CCC-SLP GN, CI 1    [id number] HC ST SWALLOWING DISCHARGE 7/5/2017 Analisa Green MS CCC-SLP GN, CI 1    [id number] HC ST MOTION FLUORO EVAL SWALLOW 4 7/5/2017 Analisa Green MS CCC-SLP GN 1          SLP G-Codes  SLP NOMS Used?: Yes  Functional Limitations: Swallowing  Swallow Current Status (): At least 1 percent but less than 20 percent impaired, limited or restricted  Swallow Goal Status (): At least 1 percent but less than 20 percent impaired, limited or restricted  Swallow Discharge Status (): At least 1 percent but less than 20 percent impaired, limited or restricted        Analisa Green MS RENATA-SLP  7/5/2017

## 2017-07-07 NOTE — PROGRESS NOTES
Chief Complaint:          Chief Complaint   Patient presents with   • Prostate Cancer       HPI:   89 y.o. male with history of prostate cancer who was last seen by Dr. Floyd on 03/22/2017 as a transition of care for [facility]. Patient has been on Casodex and also on Lupron his PSA was seen by Dr. Rodriguez was 26.6 and he was treated with an injection of Eligard. He had a PSA drawn on his office visit of 03/22/2017 which reveals a level of 230.11. He does have a Hernandez catheter in place for incomplete bladder emptying but his son, who is present with him today, states he does not drink very much water during the day. His son states that while he was in [place] he did have a CT scan secondary to the prostate cancer that did reveal metastases to the bone.  Patient's son states at the present time patient is having severe itching and is currently being treated for possible scabies and has had 2 treatments of permethrin applied but continues to scratch his skin until it bleeds at times.          Past Medical History:        Past Medical History:   Diagnosis Date   • Atrial fibrillation 11/06/2014   • BPH (benign prostatic hyperplasia)    • CAD (coronary artery disease)    • Cancer    • Diabetes    • Disease of thyroid gland    • Dyslipidemia    • Gastroesophageal reflux disease    • Hypertension    • Prostate cancer     20 years    • Restless leg syndrome    • TIA (transient ischemic attack)     History of Bell’s palsy.         Current Meds:     Current Outpatient Prescriptions   Medication Sig Dispense Refill   • acetaminophen (TYLENOL) 500 MG tablet Take 500 mg by mouth Every 6 (Six) Hours As Needed for Mild Pain (1-3).     • albuterol (PROVENTIL) (2.5 MG/3ML) 0.083% nebulizer solution Take 2.5 mg by nebulization Every 4 (Four) Hours As Needed for Wheezing.     • amiodarone (PACERONE) 200 MG tablet Take 200 mg by mouth daily.     • aspirin 81 MG chewable tablet Chew 81 mg daily.     • ciprofloxacin (CIPRO)  500 MG tablet Take 1 tablet by mouth 2 (Two) Times a Day. 20 tablet 0   • lactulose (CHRONULAC) 10 GM/15ML solution Take 20 g by mouth 2 (Two) Times a Day As Needed.     • levothyroxine (SYNTHROID, LEVOTHROID) 50 MCG tablet Take 50 mcg by mouth daily.     • megestrol (MEGACE) 40 MG/ML suspension Take 200 mg by mouth 2 (Two) Times a Day.     • melatonin 1 MG tablet Take 2 mg by mouth.     • oseltamivir (TAMIFLU) 75 MG capsule Take 75 mg by mouth Daily.     • polyethylene glycol (MIRALAX) packet Take 17 g by mouth Daily.     • traMADol (ULTRAM) 50 MG tablet Take 50 mg by mouth 2 (Two) Times a Day As Needed for moderate pain (4-6).     • bicalutamide (CASODEX) 50 MG chemo tablet Take 1 tablet by mouth Daily. 14 tablet 0     No current facility-administered medications for this visit.         Allergies:      Allergies   Allergen Reactions   • Isosorbide Mononitrate [Isosorbide Nitrate]       dizziness/severe weakness        Past Surgical History:     Past Surgical History:   Procedure Laterality Date   • CARDIAC CATHETERIZATION     • CHOLECYSTECTOMY      remote    • CORONARY STENT PLACEMENT  12/01/2005    LÁZARO LAD         Social History:     Social History     Social History   • Marital status:      Spouse name: N/A   • Number of children: N/A   • Years of education: N/A     Occupational History   • Not on file.     Social History Main Topics   • Smoking status: Never Smoker   • Smokeless tobacco: Never Used   • Alcohol use No   • Drug use: No   • Sexual activity: Defer     Other Topics Concern   • Not on file     Social History Narrative       Family History:     Family History   Problem Relation Age of Onset   • Cancer Sister    • Cancer Brother        Review of Systems:     Review of Systems    Physical Exam:     Physical Exam   Constitutional: He is oriented to person, place, and time. He appears well-developed and well-nourished. No distress.   Abdominal: Soft. Bowel sounds are normal. He exhibits no  distension and no mass. There is no tenderness. There is no rebound and no guarding. No hernia.   Genitourinary:   Genitourinary Comments: Hernandez catheter   Musculoskeletal:   Wheelchair for mobility   Neurological: He is alert and oriented to person, place, and time.   Skin: Skin is warm and dry. No rash noted. He is not diaphoretic. No erythema. No pallor.   Psychiatric: He has a normal mood and affect. His behavior is normal. Judgment and thought content normal.   Nursing note and vitals reviewed.      Procedure:       Assessment:     Encounter Diagnosis   Name Primary?   • Prostate cancer Yes     Orders Placed This Encounter   Procedures   • PSA   • Basic Metabolic Panel       Plan:   BMP will be ordered today to check the kidney functioning along with a PSA. Patient will be started on Casodex 50 mg one tablet twice daily ×2 weeks for flare up seen for elevation of his PSA per order of Dr. Floyd with a return appointment in 2 weeks for possible Lupron injection.    Patient was seen for 25 minutes    This document has been electronically signed by Magda ALVARES on 07/07/2017 at 2:43 PM

## 2017-07-21 NOTE — PROGRESS NOTES
Chief Complaint:          Chief Complaint   Patient presents with   • Prostate Cancer       HPI:   89 y.o. male.       89-year-old white male who had his care transferred from the [facility] for follow-up of prostate cancer he was last seen there on March 10, 2015 for follow-up cancer he was treated with an injection of Lupron.  He been on Casodex his PSA was 4.4 he had no weight loss or bone pain.  He was on Eliquis for the recent onset of atrial fibrillation.  His urinalysis at that time was negative.  His PSA was 26.6 and he was treated with a shot of Eligard.  He was then seen on April 21.  With no change and that's when he got his Eligard.  He has not had one since we discussed the situation with his caregiver at length.  Given his age and state of health I think a Hernandez catheter and treatment with minimal hormonal blockade is most appropriate in this juncture I'll see him back and we'll follow-up closely we have a PSA pending at the time of this dictation.  Always records are scanned in the patient's.He is currently accompanied by his caregiver his repeat PSA was 171.  Recommending reinstitution of Lupron.  He's had 2 weeks of Casodex to prevent a flare phenomenon.  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  He has chronic indwelling Hernandez catheter.  He was given his 6 month Lupron injection will be seen in 1 month for PSA      Past Medical History:        Past Medical History:   Diagnosis Date   • Atrial fibrillation 11/06/2014   • BPH (benign prostatic hyperplasia)    • CAD (coronary artery disease)    • Cancer    • Diabetes    • Disease of thyroid gland    • Dyslipidemia    • Gastroesophageal reflux disease    • Hypertension    • Prostate cancer     20 years    • Restless leg syndrome    • TIA (transient ischemic attack)     History of Bell’s palsy.         Current Meds:     Current Outpatient Prescriptions   Medication Sig Dispense Refill   • acetaminophen (TYLENOL) 500 MG tablet Take 500 mg  by mouth Every 6 (Six) Hours As Needed for Mild Pain (1-3).     • albuterol (PROVENTIL) (2.5 MG/3ML) 0.083% nebulizer solution Take 2.5 mg by nebulization Every 4 (Four) Hours As Needed for Wheezing.     • amiodarone (PACERONE) 200 MG tablet Take 200 mg by mouth daily.     • aspirin 81 MG chewable tablet Chew 81 mg daily.     • bicalutamide (CASODEX) 50 MG chemo tablet Take 1 tablet by mouth Daily. 14 tablet 0   • ciprofloxacin (CIPRO) 500 MG tablet Take 1 tablet by mouth 2 (Two) Times a Day. 20 tablet 0   • lactulose (CHRONULAC) 10 GM/15ML solution Take 20 g by mouth 2 (Two) Times a Day As Needed.     • levothyroxine (SYNTHROID, LEVOTHROID) 50 MCG tablet Take 50 mcg by mouth daily.     • megestrol (MEGACE) 40 MG/ML suspension Take 200 mg by mouth 2 (Two) Times a Day.     • melatonin 1 MG tablet Take 2 mg by mouth.     • oseltamivir (TAMIFLU) 75 MG capsule Take 75 mg by mouth Daily.     • polyethylene glycol (MIRALAX) packet Take 17 g by mouth Daily.     • traMADol (ULTRAM) 50 MG tablet Take 50 mg by mouth 2 (Two) Times a Day As Needed for moderate pain (4-6).       No current facility-administered medications for this visit.         Allergies:      Allergies   Allergen Reactions   • Isosorbide Mononitrate [Isosorbide Nitrate]       dizziness/severe weakness        Past Surgical History:     Past Surgical History:   Procedure Laterality Date   • CARDIAC CATHETERIZATION     • CHOLECYSTECTOMY      remote    • CORONARY STENT PLACEMENT  12/01/2005    LÁZARO BYERS         Social History:     Social History     Social History   • Marital status:      Spouse name: N/A   • Number of children: N/A   • Years of education: N/A     Occupational History   • Not on file.     Social History Main Topics   • Smoking status: Never Smoker   • Smokeless tobacco: Never Used   • Alcohol use No   • Drug use: No   • Sexual activity: Defer     Other Topics Concern   • Not on file     Social History Narrative       Family History:      Family History   Problem Relation Age of Onset   • Cancer Sister    • Cancer Brother        Review of Systems:     Review of Systems   Constitutional: Positive for appetite change, chills, fatigue and unexpected weight change.   HENT: Negative.    Eyes: Negative.    Respiratory: Negative.    Cardiovascular: Negative.    Gastrointestinal: Negative.    Endocrine: Negative.    Genitourinary: Positive for flank pain and frequency.   Allergic/Immunologic: Negative.    Neurological: Negative.    Hematological: Negative.    Psychiatric/Behavioral: Positive for confusion.       Physical Exam:     Physical Exam   Genitourinary:   Genitourinary Comments: 89-year-old moribund-appearing white male with a chronic catheter and chronic ammoniacal changes with regards to odor he was given a Lupron injection today.  He had a left T3 rash consistent with resolving shingles   Vitals reviewed.      Procedure:       Assessment:   No diagnosis found.  No orders of the defined types were placed in this encounter.      Plan:   Metastatic adenocarcinoma the prostate given Lupron after appropriate treatment for the potential of a flare phenomenon           This document has been electronically signed by JOELLEN ALCOCER MD July 21, 2017 9:22 AM

## 2017-09-05 PROBLEM — N39.0 URINARY TRACT INFECTION ASSOCIATED WITH INDWELLING URETHRAL CATHETER (HCC): Status: ACTIVE | Noted: 2017-01-01

## 2017-09-05 PROBLEM — T83.511A URINARY TRACT INFECTION ASSOCIATED WITH INDWELLING URETHRAL CATHETER (HCC): Status: ACTIVE | Noted: 2017-01-01

## 2017-09-05 NOTE — PROGRESS NOTES
Chief Complaint:          Chief Complaint   Patient presents with   • Prostate Cancer       HPI:   89 y.o. male.  89-year-old white male who had his care transferred from the [facility] for follow-up of prostate cancer he was last seen there on March 10, 2015 for follow-up cancer he was treated with an injection of Lupron.  He been on Casodex his PSA was 4.4 he had no weight loss or bone pain.  He was on Eliquis for the recent onset of atrial fibrillation.  His urinalysis at that time was negative.  His PSA was 26.6 and he was treated with a shot of Eligard.  He was then seen on April 21.  With no change and that's when he got his Eligard.  He has not had one since we discussed the situation with his caregiver at length.  Given his age and state of health I think a Hernandez catheter and treatment with minimal hormonal blockade is most appropriate in this juncture I'll see him back and we'll follow-up closely we have a PSA pending at the time of this dictation.  Always records are scanned in the patient's.He is currently accompanied by his caregiver his repeat PSA was 171.  Recommending reinstitution of Lupron.  He's had 2 weeks of Casodex to prevent a flare phenomenon.  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  He has chronic indwelling Hernandez catheter.  He was given his 6 month Lupron injection will be seen in 1 month for PSA.In the interim he had a severe urinary tract infection with 3 different organisms.  These included resistant organisms of Escherichia coli, Staphylococcus aureus that was MRSA, or coccus faecalis.  He was treated appropriately he's here for repeat PSA to be sure it's dropped dramatically he's 89 with numerous comorbidities.  He appears moribund.  He is confined to a wheelchair.           Past Medical History:        Past Medical History:   Diagnosis Date   • Atrial fibrillation 11/06/2014   • BPH (benign prostatic hyperplasia)    • CAD (coronary artery disease)    • Cancer    •  Diabetes    • Disease of thyroid gland    • Dyslipidemia    • Gastroesophageal reflux disease    • Hypertension    • Prostate cancer     20 years    • Restless leg syndrome    • TIA (transient ischemic attack)     History of Bell’s palsy.         Current Meds:     Current Outpatient Prescriptions   Medication Sig Dispense Refill   • acetaminophen (TYLENOL) 500 MG tablet Take 500 mg by mouth Every 6 (Six) Hours As Needed for Mild Pain (1-3).     • albuterol (PROVENTIL) (2.5 MG/3ML) 0.083% nebulizer solution Take 2.5 mg by nebulization Every 4 (Four) Hours As Needed for Wheezing.     • amiodarone (PACERONE) 200 MG tablet Take 200 mg by mouth daily.     • aspirin 81 MG chewable tablet Chew 81 mg daily.     • bicalutamide (CASODEX) 50 MG chemo tablet Take 1 tablet by mouth Daily. 14 tablet 0   • ciprofloxacin (CIPRO) 500 MG tablet Take 1 tablet by mouth 2 (Two) Times a Day. 20 tablet 0   • lactulose (CHRONULAC) 10 GM/15ML solution Take 20 g by mouth 2 (Two) Times a Day As Needed.     • levothyroxine (SYNTHROID, LEVOTHROID) 50 MCG tablet Take 50 mcg by mouth daily.     • megestrol (MEGACE) 40 MG/ML suspension Take 200 mg by mouth 2 (Two) Times a Day.     • melatonin 1 MG tablet Take 2 mg by mouth.     • oseltamivir (TAMIFLU) 75 MG capsule Take 75 mg by mouth Daily.     • polyethylene glycol (MIRALAX) packet Take 17 g by mouth Daily.     • traMADol (ULTRAM) 50 MG tablet Take 50 mg by mouth 2 (Two) Times a Day As Needed for moderate pain (4-6).       No current facility-administered medications for this visit.         Allergies:      Allergies   Allergen Reactions   • Isosorbide Mononitrate [Isosorbide Nitrate]       dizziness/severe weakness        Past Surgical History:     Past Surgical History:   Procedure Laterality Date   • CARDIAC CATHETERIZATION     • CHOLECYSTECTOMY      remote    • CORONARY STENT PLACEMENT  12/01/2005    LÁZARO ZEESHAN         Social History:     Social History     Social History   • Marital status:       Spouse name: N/A   • Number of children: N/A   • Years of education: N/A     Occupational History   • Not on file.     Social History Main Topics   • Smoking status: Never Smoker   • Smokeless tobacco: Never Used   • Alcohol use No   • Drug use: No   • Sexual activity: Defer     Other Topics Concern   • Not on file     Social History Narrative       Family History:     Family History   Problem Relation Age of Onset   • Cancer Sister    • Cancer Brother        Review of Systems:     Review of Systems   Constitutional: Positive for activity change, appetite change, fatigue and unexpected weight change.   Genitourinary: Positive for difficulty urinating.   Musculoskeletal: Positive for arthralgias, back pain and joint swelling.   Psychiatric/Behavioral: Positive for behavioral problems.       Physical Exam:     Physical Exam   Constitutional:   Moribund-appearing white male with a chronic cold Hernandez catheter gravity drainage his head  Unremarkable but he appears extremely ill and doesn't respond   Nursing note and vitals reviewed.      Procedure:       Assessment:   No diagnosis found.  No orders of the defined types were placed in this encounter.      Plan:   **Prostate cancer-currently undergoing Lupron treatment for super agonist and rechecking a PSA today he was lost to follow-up and previously was treated with the [facility]  Recurrent urinary tract infection secondary to chronic indwelling Hernandez*           This document has been electronically signed by JOELLEN ALCOCER MD September 5, 2017 8:56 AM

## 2017-09-07 NOTE — TELEPHONE ENCOUNTER
I CALLED THE PATIENTS CARE FACILITY AND GAVE THEM THE DATE AND TIME OF THE APPT BECAUSE dR ALCOCER WANTED HIM TO COME IN SOONER BECAUSE OF HIS PSA BEING ELEVATED.

## 2017-10-03 NOTE — PROGRESS NOTES
Chief Complaint:          Chief Complaint   Patient presents with   • Prostate Cancer       HPI:   89 y.o. male.  89-year-old white male who had his care transferred from the [facility] for follow-up of prostate cancer he was last seen there on March 10, 2015 for follow-up cancer he was treated with an injection of Lupron.  He been on Casodex his PSA was 4.4 he had no weight loss or bone pain.  He was on Eliquis for the recent onset of atrial fibrillation.  His urinalysis at that time was negative.  His PSA was 26.6 and he was treated with a shot of Eligard.  He was then seen on April 21.  With no change and that's when he got his Eligard.  He has not had one since we discussed the situation with his caregiver at length.  Given his age and state of health I think a Hernandez catheter and treatment with minimal hormonal blockade is most appropriate in this juncture I'll see him back and we'll follow-up closely we have a PSA pending at the time of this dictation.  Always records are scanned in the patient's.He is currently accompanied by his caregiver his repeat PSA was 171.  Recommending reinstitution of Lupron.  He's had 2 weeks of Casodex to prevent a flare phenomenon.  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  He has chronic indwelling Hernandez catheter.  He was given his 6 month Lupron injection will be seen in 1 month for PSA.In the interim he had a severe urinary tract infection with 3 different organisms.  These included resistant organisms of Escherichia coli, Staphylococcus aureus that was MRSA, or coccus faecalis.  He was treated appropriately he's here for repeat PSA to be sure it's dropped dramatically he's 89 with numerous comorbidities.  He appears moribund.  He is confined to a wheelchair.  Today he returns accompanied by daughter and granddaughter.  He had a rise in his PSA to 277 despite an appropriate Lupron treatment.  He is ventral meatal erosion secondary to the Hernandez.  We discussed the  next option being aggressive treatment with a referral to the oncologist versus continued observation and eventual hospice.  They have discuss this amongst themselves I spoke with his son who is the power of  on the phone and we have agreed to proceed with conservative management and hospice when appropriate we will leave the catheter in.  We will attempt to take some of the traction off to decrease the penile pain.  His family would like a staging CT scan to be sure there were not dealing with something it's very easily treatable such as hydronephrosis etc.  He has androgen insensitive prostate cancer           Past Medical History:        Past Medical History:   Diagnosis Date   • Atrial fibrillation 11/06/2014   • BPH (benign prostatic hyperplasia)    • CAD (coronary artery disease)    • Cancer    • Diabetes    • Disease of thyroid gland    • Dyslipidemia    • Gastroesophageal reflux disease    • Hypertension    • Prostate cancer     20 years    • Restless leg syndrome    • TIA (transient ischemic attack)     History of Bell’s palsy.         Current Meds:     Current Outpatient Prescriptions   Medication Sig Dispense Refill   • acetaminophen (TYLENOL) 500 MG tablet Take 500 mg by mouth Every 6 (Six) Hours As Needed for Mild Pain (1-3).     • albuterol (PROVENTIL) (2.5 MG/3ML) 0.083% nebulizer solution Take 2.5 mg by nebulization Every 4 (Four) Hours As Needed for Wheezing.     • amiodarone (PACERONE) 200 MG tablet Take 200 mg by mouth daily.     • aspirin 81 MG chewable tablet Chew 81 mg daily.     • bicalutamide (CASODEX) 50 MG chemo tablet Take 1 tablet by mouth Daily. 14 tablet 0   • ciprofloxacin (CIPRO) 500 MG tablet Take 1 tablet by mouth 2 (Two) Times a Day. 20 tablet 0   • lactulose (CHRONULAC) 10 GM/15ML solution Take 20 g by mouth 2 (Two) Times a Day As Needed.     • levothyroxine (SYNTHROID, LEVOTHROID) 50 MCG tablet Take 50 mcg by mouth daily.     • megestrol (MEGACE) 40 MG/ML suspension Take  200 mg by mouth 2 (Two) Times a Day.     • melatonin 1 MG tablet Take 2 mg by mouth.     • oseltamivir (TAMIFLU) 75 MG capsule Take 75 mg by mouth Daily.     • polyethylene glycol (MIRALAX) packet Take 17 g by mouth Daily.     • traMADol (ULTRAM) 50 MG tablet Take 50 mg by mouth 2 (Two) Times a Day As Needed for moderate pain (4-6).       No current facility-administered medications for this visit.         Allergies:      Allergies   Allergen Reactions   • Isosorbide Mononitrate [Isosorbide Nitrate]       dizziness/severe weakness        Past Surgical History:     Past Surgical History:   Procedure Laterality Date   • CARDIAC CATHETERIZATION     • CHOLECYSTECTOMY      remote    • CORONARY STENT PLACEMENT  12/01/2005    LÁZARO LAD         Social History:     Social History     Social History   • Marital status:      Spouse name: N/A   • Number of children: N/A   • Years of education: N/A     Occupational History   • Not on file.     Social History Main Topics   • Smoking status: Never Smoker   • Smokeless tobacco: Never Used   • Alcohol use No   • Drug use: No   • Sexual activity: Defer     Other Topics Concern   • Not on file     Social History Narrative       Family History:     Family History   Problem Relation Age of Onset   • Cancer Sister    • Cancer Brother        Review of Systems:     Review of Systems   Constitutional: Positive for appetite change and fatigue.   HENT: Negative.    Eyes: Negative.    Respiratory: Negative.    Cardiovascular: Negative.    Gastrointestinal: Negative.    Endocrine: Negative.    Genitourinary: Positive for difficulty urinating, penile pain and penile swelling.   Musculoskeletal: Positive for back pain.   Allergic/Immunologic: Negative.    Neurological: Negative.    Hematological: Negative.    Psychiatric/Behavioral: Positive for confusion.       Physical Exam:     Physical Exam   Constitutional: He is oriented to person, place, and time.   HENT:   Head: Normocephalic and  atraumatic.   Eyes: Conjunctivae and EOM are normal. Pupils are equal, round, and reactive to light.   Neck: Normal range of motion.   Cardiovascular: Normal rate, regular rhythm, normal heart sounds and intact distal pulses.    Pulmonary/Chest: Effort normal and breath sounds normal.   Abdominal: Soft. Bowel sounds are normal.   Genitourinary:   Genitourinary Comments: Ill-appearing moribund white male with a catheter to gravity drainage and ventral meatal erosion secondary to Hernandez traction   Musculoskeletal: Normal range of motion.   Neurological: He is alert and oriented to person, place, and time. He has normal reflexes.   Skin: Skin is warm and dry.   Psychiatric: He has a normal mood and affect. His behavior is normal. Judgment and thought content normal.   Nursing note and vitals reviewed.      Procedure:       Assessment:   No diagnosis found.  No orders of the defined types were placed in this encounter.      Plan:   Metastatic adenocarcinoma of the prostate I'm going to stage him with a CT scan and discuss with the family via the telephone I am recommending hospice therapy secondary to his advanced age of 89 and the fact that he's had several years of disease-free that he did not expect  Urinary retention -leave Hernandez catheter gravity drainage but most importantly recommend taking the traction off to decrease the pain and pressure on the urethra        This document has been electronically signed by JOELLEN ALCOCER MD October 3, 2017 11:18 AM

## 2017-10-27 NOTE — ED PROVIDER NOTES
Subjective   History of Present Illness  89 y/o M w/h/o dementia and urinary incontinence with long-term indwelling steven catheter p/a traumatic removal of his steven catheter resulted in a laceration of his penis earlier today. Per nursing home pt has h/o pulling his steven catheter out. Pt's laceration is on the dorsal side of his penis and extends from the urethral meatus to the base of the penis. Steven catheter is currently in place with good uop.   Review of Systems   Unable to perform ROS: Dementia   All other systems reviewed and are negative.      Past Medical History:   Diagnosis Date   • Atrial fibrillation 11/06/2014   • BPH (benign prostatic hyperplasia)    • CAD (coronary artery disease)    • Cancer    • Diabetes    • Disease of thyroid gland    • Dyslipidemia    • Gastroesophageal reflux disease    • Hypertension    • Prostate cancer     20 years    • Restless leg syndrome    • TIA (transient ischemic attack)     History of Bell’s palsy.       Allergies   Allergen Reactions   • Isosorbide Mononitrate [Isosorbide Nitrate]       dizziness/severe weakness       Past Surgical History:   Procedure Laterality Date   • CARDIAC CATHETERIZATION     • CHOLECYSTECTOMY      remote    • CORONARY STENT PLACEMENT  12/01/2005    LÁZARO LAD       Family History   Problem Relation Age of Onset   • Cancer Sister    • Cancer Brother        Social History     Social History   • Marital status:      Spouse name: N/A   • Number of children: N/A   • Years of education: N/A     Social History Main Topics   • Smoking status: Never Smoker   • Smokeless tobacco: Never Used   • Alcohol use No   • Drug use: No   • Sexual activity: Defer     Other Topics Concern   • None     Social History Narrative           Objective   Physical Exam   Constitutional: He appears well-developed and well-nourished. He is active.   HENT:   Head: Normocephalic and atraumatic.   Right Ear: Hearing and external ear normal.   Left Ear: Hearing and  external ear normal.   Nose: Nose normal.   Mouth/Throat: Uvula is midline, oropharynx is clear and moist and mucous membranes are normal.   Eyes: Conjunctivae, EOM and lids are normal. Pupils are equal, round, and reactive to light.   Neck: Trachea normal, normal range of motion, full passive range of motion without pain and phonation normal. Neck supple.   Cardiovascular: Normal rate, regular rhythm and normal heart sounds.    Pulmonary/Chest: Effort normal and breath sounds normal.   Abdominal: Soft. Normal appearance.   Genitourinary: Testes normal. Circumcised.         Neurological: He is alert. He is disoriented.   Skin: Skin is warm and dry.   Psychiatric: He has a normal mood and affect. His behavior is normal. Cognition and memory are impaired. He exhibits abnormal recent memory and abnormal remote memory.   Nursing note and vitals reviewed.      Procedures         ED Course  ED Course    I spoke with urology on-call at [facility] who stated that this was chronic condition with nothing that could be done to correct it. The urologist on-call recommended educating the nursing home to reduce tension on the steven catheter and  them that this is an expected process.               MDM  Number of Diagnoses or Management Options  Steven catheter in place: established and worsening  Penile laceration, initial encounter: established and worsening  Risk of Complications, Morbidity, and/or Mortality  Presenting problems: high  Diagnostic procedures: high  Management options: high    Patient Progress  Patient progress: stable      Final diagnoses:   Penile laceration, initial encounter   Steven catheter in place            Praful Valle MD  10/27/17 2519

## 2017-10-27 NOTE — ED NOTES
Contacted [facility] in [facility] requesting on call urologist Dr. Andrew Mena  10/27/17 7153

## 2017-10-27 NOTE — ED NOTES
Report called to ramu Sawyer at [facility]      Chandler Salamanca, LUIS  10/27/17 7279

## 2017-10-27 NOTE — ED NOTES
Pt arrived with steven catheter in place, there is an ulceration noted on the underside of his right penis area that appears to be caused from the steven catheter tubing.     Maritza Rich RN  10/27/17 1823

## 2017-10-27 NOTE — ED NOTES
Attempted to call [facility] in house crew for pt transport back to nursing home, no answer. Will try again later     Maritza Mena  10/27/17 1349

## 2017-12-07 NOTE — ED NOTES
Bladder scan done 461mL  Pt tolerated well   Salma Perkins RN  12/07/17 1755       Salma Perkins RN  12/07/17 1751

## 2017-12-07 NOTE — ED NOTES
Irrigated pt bladder, would irrigate to clear. Pt tolerated well     Salma Perkins RN  12/07/17 4127

## 2017-12-08 NOTE — ED PROVIDER NOTES
Subjective   Patient is a 89 y.o. male presenting with male genitourinary complaint.   Male  Problem   Presenting symptoms: no dysuria    Presenting symptoms comment:  Hematuria, prostate ca.  Relieved by:  Nothing  Worsened by:  Nothing  Ineffective treatments: steven irrigated.  Associated symptoms: abdominal pain and hematuria    Associated symptoms: no fever        Review of Systems   Constitutional: Negative.  Negative for fever.   HENT: Negative.    Respiratory: Negative.    Cardiovascular: Negative.  Negative for chest pain.   Gastrointestinal: Positive for abdominal pain.   Endocrine: Negative.    Genitourinary: Positive for hematuria and urgency. Negative for dysuria.   Skin: Negative.    Neurological: Negative.    Psychiatric/Behavioral: Negative.    All other systems reviewed and are negative.      Past Medical History:   Diagnosis Date   • Atrial fibrillation 11/06/2014   • BPH (benign prostatic hyperplasia)    • CAD (coronary artery disease)    • Cancer    • Diabetes    • Disease of thyroid gland    • Dyslipidemia    • Gastroesophageal reflux disease    • Hypertension    • Prostate cancer     20 years    • Restless leg syndrome    • TIA (transient ischemic attack)     History of Bell’s palsy.       Allergies   Allergen Reactions   • Isosorbide Mononitrate [Isosorbide Nitrate]       dizziness/severe weakness       Past Surgical History:   Procedure Laterality Date   • CARDIAC CATHETERIZATION     • CHOLECYSTECTOMY      remote    • CORONARY STENT PLACEMENT  12/01/2005    LÁZARO LAD       Family History   Problem Relation Age of Onset   • Cancer Sister    • Cancer Brother        Social History     Social History   • Marital status:      Spouse name: N/A   • Number of children: N/A   • Years of education: N/A     Social History Main Topics   • Smoking status: Never Smoker   • Smokeless tobacco: Never Used   • Alcohol use No   • Drug use: No   • Sexual activity: Defer     Other Topics Concern   • None      Social History Narrative           Objective   Physical Exam   Constitutional: He is oriented to person, place, and time. He appears well-developed and well-nourished. No distress.   HENT:   Head: Normocephalic and atraumatic.   Right Ear: External ear normal.   Left Ear: External ear normal.   Nose: Nose normal.   Eyes: Conjunctivae and EOM are normal. Pupils are equal, round, and reactive to light.   Neck: Normal range of motion. Neck supple. No JVD present. No tracheal deviation present.   Cardiovascular: Normal rate, regular rhythm and normal heart sounds.    No murmur heard.  Pulmonary/Chest: Effort normal and breath sounds normal. No respiratory distress. He has no wheezes.   Abdominal: Soft. Bowel sounds are normal. There is no tenderness.   Genitourinary:   Genitourinary Comments: Urine grossly positive   Musculoskeletal: Normal range of motion. He exhibits no edema or deformity.   Neurological: He is alert and oriented to person, place, and time. No cranial nerve deficit.   Skin: Skin is warm and dry. No rash noted. He is not diaphoretic. No erythema. No pallor.   Psychiatric: He has a normal mood and affect. His behavior is normal. Thought content normal.   Nursing note and vitals reviewed.      Procedures         ED Course  ED Course                  MDM    Final diagnoses:   Urinary tract infection associated with indwelling urethral catheter, subsequent encounter            Mars Guerrero MD  12/08/17 0048

## 2017-12-08 NOTE — ED NOTES
Pt is leaving the ER at this time A&Ox4, RR even and unlabored, skin WPD, NADN. Pt is leaving with Hernandez Catheter in tact with dark red drainage no clots noted. MINNIE.     Laura Rosas RN  12/07/17 9954

## 2017-12-08 NOTE — ED NOTES
[facility] here at this time to transport Pt to [facility].      Laura Rosas RN  12/07/17 7106

## 2017-12-08 NOTE — ED NOTES
Report received from Salma uHertas RN. PT is resting on stretcher comfortably, PT catheter in tact with dark red drainage free of clots. Pt vital signs WNL, RR even and unlabored, skin WPD, NADN. Pt requesting something to drink and provided per Dr. Cesar nunez. Family at bedside. [facility].      Laura Rosas RN  12/07/17 8575

## 2017-12-08 NOTE — ED NOTES
Report called to Sidra Gregory at [facility].     Laura Rosas RN  12/07/17 8153

## 2018-02-14 ENCOUNTER — EPISODE CHANGES (OUTPATIENT)
Dept: CASE MANAGEMENT | Facility: OTHER | Age: 83
End: 2018-02-14